# Patient Record
Sex: FEMALE | Race: WHITE
[De-identification: names, ages, dates, MRNs, and addresses within clinical notes are randomized per-mention and may not be internally consistent; named-entity substitution may affect disease eponyms.]

---

## 2019-11-28 ENCOUNTER — HOSPITAL ENCOUNTER (INPATIENT)
Dept: HOSPITAL 97 - ER | Age: 69
LOS: 9 days | Discharge: NURSING, REHAB OR LONG-TERM CARE | DRG: 871 | End: 2019-12-07
Attending: INTERNAL MEDICINE | Admitting: INTERNAL MEDICINE
Payer: COMMERCIAL

## 2019-11-28 VITALS — BODY MASS INDEX: 32.1 KG/M2

## 2019-11-28 DIAGNOSIS — E87.6: ICD-10-CM

## 2019-11-28 DIAGNOSIS — B96.20: ICD-10-CM

## 2019-11-28 DIAGNOSIS — E83.39: ICD-10-CM

## 2019-11-28 DIAGNOSIS — R79.89: ICD-10-CM

## 2019-11-28 DIAGNOSIS — N30.00: ICD-10-CM

## 2019-11-28 DIAGNOSIS — M62.82: ICD-10-CM

## 2019-11-28 DIAGNOSIS — A41.9: Primary | ICD-10-CM

## 2019-11-28 DIAGNOSIS — Z86.73: ICD-10-CM

## 2019-11-28 DIAGNOSIS — I50.32: ICD-10-CM

## 2019-11-28 DIAGNOSIS — G93.41: ICD-10-CM

## 2019-11-28 DIAGNOSIS — Z88.2: ICD-10-CM

## 2019-11-28 DIAGNOSIS — E83.42: ICD-10-CM

## 2019-11-28 DIAGNOSIS — N17.9: ICD-10-CM

## 2019-11-28 DIAGNOSIS — I11.0: ICD-10-CM

## 2019-11-28 PROCEDURE — 70450 CT HEAD/BRAIN W/O DYE: CPT

## 2019-11-28 PROCEDURE — 96365 THER/PROPH/DIAG IV INF INIT: CPT

## 2019-11-28 PROCEDURE — 84484 ASSAY OF TROPONIN QUANT: CPT

## 2019-11-28 PROCEDURE — 94760 N-INVAS EAR/PLS OXIMETRY 1: CPT

## 2019-11-28 PROCEDURE — 85025 COMPLETE CBC W/AUTO DIFF WBC: CPT

## 2019-11-28 PROCEDURE — 82570 ASSAY OF URINE CREATININE: CPT

## 2019-11-28 PROCEDURE — 87186 SC STD MICRODIL/AGAR DIL: CPT

## 2019-11-28 PROCEDURE — 83605 ASSAY OF LACTIC ACID: CPT

## 2019-11-28 PROCEDURE — 82553 CREATINE MB FRACTION: CPT

## 2019-11-28 PROCEDURE — 71045 X-RAY EXAM CHEST 1 VIEW: CPT

## 2019-11-28 PROCEDURE — 83880 ASSAY OF NATRIURETIC PEPTIDE: CPT

## 2019-11-28 PROCEDURE — 93005 ELECTROCARDIOGRAM TRACING: CPT

## 2019-11-28 PROCEDURE — 87040 BLOOD CULTURE FOR BACTERIA: CPT

## 2019-11-28 PROCEDURE — 97161 PT EVAL LOW COMPLEX 20 MIN: CPT

## 2019-11-28 PROCEDURE — 80048 BASIC METABOLIC PNL TOTAL CA: CPT

## 2019-11-28 PROCEDURE — 87088 URINE BACTERIA CULTURE: CPT

## 2019-11-28 PROCEDURE — 86317 IMMUNOASSAY INFECTIOUS AGENT: CPT

## 2019-11-28 PROCEDURE — 84156 ASSAY OF PROTEIN URINE: CPT

## 2019-11-28 PROCEDURE — 97110 THERAPEUTIC EXERCISES: CPT

## 2019-11-28 PROCEDURE — 86225 DNA ANTIBODY NATIVE: CPT

## 2019-11-28 PROCEDURE — 82652 VIT D 1 25-DIHYDROXY: CPT

## 2019-11-28 PROCEDURE — 76770 US EXAM ABDO BACK WALL COMP: CPT

## 2019-11-28 PROCEDURE — 80202 ASSAY OF VANCOMYCIN: CPT

## 2019-11-28 PROCEDURE — 87086 URINE CULTURE/COLONY COUNT: CPT

## 2019-11-28 PROCEDURE — 87340 HEPATITIS B SURFACE AG IA: CPT

## 2019-11-28 PROCEDURE — 86706 HEP B SURFACE ANTIBODY: CPT

## 2019-11-28 PROCEDURE — 84132 ASSAY OF SERUM POTASSIUM: CPT

## 2019-11-28 PROCEDURE — 86021 WBC ANTIBODY IDENTIFICATION: CPT

## 2019-11-28 PROCEDURE — 97112 NEUROMUSCULAR REEDUCATION: CPT

## 2019-11-28 PROCEDURE — 86235 NUCLEAR ANTIGEN ANTIBODY: CPT

## 2019-11-28 PROCEDURE — 97530 THERAPEUTIC ACTIVITIES: CPT

## 2019-11-28 PROCEDURE — 83735 ASSAY OF MAGNESIUM: CPT

## 2019-11-28 PROCEDURE — 86430 RHEUMATOID FACTOR TEST QUAL: CPT

## 2019-11-28 PROCEDURE — 36415 COLL VENOUS BLD VENIPUNCTURE: CPT

## 2019-11-28 PROCEDURE — 81003 URINALYSIS AUTO W/O SCOPE: CPT

## 2019-11-28 PROCEDURE — 83520 IMMUNOASSAY QUANT NOS NONAB: CPT

## 2019-11-28 PROCEDURE — 87077 CULTURE AEROBIC IDENTIFY: CPT

## 2019-11-28 PROCEDURE — 88108 CYTOPATH CONCENTRATE TECH: CPT

## 2019-11-28 PROCEDURE — 82550 ASSAY OF CK (CPK): CPT

## 2019-11-28 PROCEDURE — 87522 HEPATITIS C REVRS TRNSCRPJ: CPT

## 2019-11-28 PROCEDURE — 86160 COMPLEMENT ANTIGEN: CPT

## 2019-11-28 PROCEDURE — 84165 PROTEIN E-PHORESIS SERUM: CPT

## 2019-11-28 PROCEDURE — 93306 TTE W/DOPPLER COMPLETE: CPT

## 2019-11-28 PROCEDURE — 81015 MICROSCOPIC EXAM OF URINE: CPT

## 2019-11-28 PROCEDURE — 86038 ANTINUCLEAR ANTIBODIES: CPT

## 2019-11-28 PROCEDURE — 80069 RENAL FUNCTION PANEL: CPT

## 2019-11-28 PROCEDURE — 96366 THER/PROPH/DIAG IV INF ADDON: CPT

## 2019-11-28 PROCEDURE — 99285 EMERGENCY DEPT VISIT HI MDM: CPT

## 2019-11-28 PROCEDURE — 97116 GAIT TRAINING THERAPY: CPT

## 2019-11-28 PROCEDURE — 80076 HEPATIC FUNCTION PANEL: CPT

## 2019-11-28 PROCEDURE — 85610 PROTHROMBIN TIME: CPT

## 2019-11-28 PROCEDURE — 84443 ASSAY THYROID STIM HORMONE: CPT

## 2019-11-28 PROCEDURE — 86704 HEP B CORE ANTIBODY TOTAL: CPT

## 2019-11-28 PROCEDURE — 80053 COMPREHEN METABOLIC PANEL: CPT

## 2019-11-28 NOTE — XMS REPORT
[facility]

 Created on:November 3, 2019



Patient:Meghan Givens

Sex:Female

:1950

External Reference #:[id number]





Demographics







 Address  [address] [address]

 

 Phone  [phone]

 

 Preferred Language  en

 

 Marital Status  Unknown

 

 [employer] Affiliation  Unknown

 

 Race  White

 

 Ethnic Group  Unknown









Author







 Organization  [employer]









Care Team Providers







 Name  Role  Phone

 

 Bailey, Na  Provider Role  Unavailable









Allergies

No Known Allergies



Problems







 Problem Type  Condition  Code  Onset Dates  Condition Status

 

 Problem  Osteoporosis  M81.0    Active

 

 Problem  Depression with anxiety  F41.8    Active

 

 Problem  Benign essential HTN  I10    Active

 

 Problem  Memory disturbance  R41.3    Active

 

 Problem  Senile cataract, unspecified  H25.9    Active



   age-related cataract type,      



   unspecified laterality      

 

 Problem  GERD with esophagitis  K21.0    Active

 

 Problem  Obese  E66.9    Active

 

 Problem  Elevated blood pressure reading with  I10    Active



   diagnosis of hypertension      

 

 Problem  Body mass index (BMI) of 40.1 to  Z68.41    Active



   44.9 in adult      

 

 Problem  Hypokalemia  E87.6    Active

 

 Problem  Seasonal allergies  J30.2    Active

 

 Problem  Yeast infection of the skin  B37.2    Active

 

 Problem  Osteoporosis without current  M81.0    Active



   pathological fracture, unspecified      



   osteoporosis type      

 

 Problem  CKD (chronic kidney disease) stage  N18.3    Active



   3, GFR 30-59 ml/min      







Medications

No Known Medications



Results

No Known Results



Summary Purpose

[employer] Submission

## 2019-11-28 NOTE — XMS REPORT
[facility]

 Created on:2019



Patient:Meghan Givens

Sex:Female

:1950

External Reference #:[id number]





Demographics







 Address  [address] [place] [address]

 

 Phone  [phone]

 

 Preferred Language  en

 

 Marital Status  Unknown

 

 [employer] Affiliation  Unknown

 

 Race  White

 

 Ethnic Group  Unknown









Author







 Organization  [employer]









Care Team Providers







 Name  Role  Phone

 

 Bailey, Na  Provider Role  Unavailable









Allergies, Adverse Reactions, Alerts







 Substance  Reaction  Event Type

 

 Bactrim DS  Info Not Available  Drug Allergy







Problems







 Problem Type  Condition  Code  Onset Dates  Condition Status

 

 Assessment  Influenza vaccination declined  Z28.21    Active

 

 Assessment  Vitamin D deficiency  E55.9    Active

 

 Assessment  Colon cancer screening declined  Z53.20    Active

 

 Assessment  Screening for osteoporosis  Z13.820    Active

 

 Assessment  Screening for breast cancer  Z12.39    Active

 

 Assessment  Body mass index (BMI) of 40.1 to  Z68.41    Active



   44.9 in adult      

 

 Problem  Depression with anxiety  F41.8    Active

 

 Assessment  Osteoporosis without current  M81.0    Active



   pathological fracture, unspecified      



   osteoporosis type      

 

 Problem  Benign essential HTN  I10    Active

 

 Assessment  CKD (chronic kidney disease) stage  N18.3    Active



   3, GFR 30-59 ml/min      

 

 Problem  Yeast infection of the skin  B37.2    Active

 

 Problem  CKD (chronic kidney disease) stage  N18.3    Active



   3, GFR 30-59 ml/min      

 

 Problem  Seasonal allergies  J30.2    Active

 

 Problem  Screening for breast cancer  Z12.39    Active

 

 Problem  Bilateral lower extremity edema  R60.0    Active

 

 Assessment  Hypokalemia  E87.6    Active

 

 Assessment  Bilateral lower extremity edema  R60.0    Active

 

 Problem  Vitamin D deficiency  E55.9    Active

 

 Assessment  GERD with esophagitis  K21.0    Active

 

 Problem  Body mass index (BMI) of 40.1 to  Z68.41    Active



   44.9 in adult      

 

 Problem  Osteoporosis without current  M81.0    Active



   pathological fracture, unspecified      



   osteoporosis type      

 

 Problem  Elevated blood pressure reading  I10    Active



   with diagnosis of hypertension      

 

 Problem  Hypokalemia  E87.6    Active

 

 Problem  Memory disturbance  R41.3    Active

 

 Assessment  Elevated blood pressure reading  I10    Active



   with diagnosis of hypertension      

 

 Assessment  Encounter for general adult medical  Z00.01    Active



   examination with abnormal findings      

 

 Problem  Osteoporosis  M81.0    Active

 

 Problem  Obese  E66.9    Active

 

 Problem  Senile cataract, unspecified  H25.9    Active



   age-related cataract type,      



   unspecified laterality      

 

 Problem  GERD with esophagitis  K21.0    Active







Medications







 Medication  Code  Code  Instructions  Start  End  Status  Dosage



   System      Date  Date    

 

 Alendronate  NDC  65689224055  70 MG Orally    March  Active  TAKE ONE



 Sodium          28,    WEEKLY



               

 

 Cholecalciferol  NDC  0  5000 UNIT      Active  1 capsule



       Orally Once a        



       day        

 

 Losartan  NDC  01248399178  50 MG Orally      Active  1 tablet



 Potassium      Once a day        

 

 Nystatin  NDC  40153130805  965216 UNIT/ML      Active  1 application



       Mouth/Throat        to affected



       Four times a        area



       day        

 

 Spironolactone  NDC  49753328096  50 MG Orally      Active  1 tablet with



       Once a day        food

 

 Metoprolol  NDC  86498263679  50 MG Orally      Active  1 tablet with



 Tartrate      Twice a day        food

 

 Triamcinolone  NDC  47005127769  0.1 %      Active  1 application



 Acetonide      Externally        to affected



       Twice a day        area

 

 Protonix  NDC  53960399893  40 MG Orally      Active  1 tablet



       Once a day        

 

 Potassium  NDC  07004441584  20 MEQ Orally    May 17,  Active  2 packets



 Chloride      Once a day        with food

 

 Amlodipine  NDC  69522258867  5 MG Orally      Active  1 tablet



 Besylate      Once a day        

 

 Donepezil HCl  NDC  24777671075  10 MG Orally      Active  1 tablet at



       Once a day        bedtime

 

 Alendronate  NDC  65749485557  70 MG Orally    May 17,  Active  1 tablet



 Sodium      once a week        

 

 Ketoconazole  NDC  49853440138  2 % Externally      Active  1 application



       twice a day        to affected



               area







Results

No Known Results



Summary Purpose

[employer] Submission

## 2019-11-29 LAB
ALBUMIN SERPL BCP-MCNC: 3.2 G/DL (ref 3.4–5)
ALBUMIN SERPL BCP-MCNC: 3.5 G/DL (ref 3.4–5)
ALP SERPL-CCNC: 81 U/L (ref 45–117)
ALP SERPL-CCNC: 87 U/L (ref 45–117)
ALT SERPL W P-5'-P-CCNC: 36 U/L (ref 12–78)
ALT SERPL W P-5'-P-CCNC: 39 U/L (ref 12–78)
AST SERPL W P-5'-P-CCNC: 101 U/L (ref 15–37)
AST SERPL W P-5'-P-CCNC: 103 U/L (ref 15–37)
BUN BLD-MCNC: 11 MG/DL (ref 7–18)
BUN BLD-MCNC: 11 MG/DL (ref 7–18)
CKMB CREATINE KINASE MB: 16.1 NG/ML (ref 0.3–3.6)
GLUCOSE SERPLBLD-MCNC: 121 MG/DL (ref 74–106)
GLUCOSE SERPLBLD-MCNC: 131 MG/DL (ref 74–106)
HCT VFR BLD CALC: 41.1 % (ref 36–45)
HCT VFR BLD CALC: 45.8 % (ref 36–45)
INR BLD: 1.37
LYMPHOCYTES # SPEC AUTO: 1 K/UL (ref 0.7–4.9)
LYMPHOCYTES # SPEC AUTO: 1 K/UL (ref 0.7–4.9)
MAGNESIUM SERPL-MCNC: 1.8 MG/DL (ref 1.8–2.4)
MORPHOLOGY BLD-IMP: (no result)
PMV BLD: 8.5 FL (ref 7.6–11.3)
PMV BLD: 8.9 FL (ref 7.6–11.3)
POTASSIUM SERPL-SCNC: 2.4 MMOL/L (ref 3.5–5.1)
POTASSIUM SERPL-SCNC: 2.7 MMOL/L (ref 3.5–5.1)
RBC # BLD: 4.38 M/UL (ref 3.86–4.86)
RBC # BLD: 4.83 M/UL (ref 3.86–4.86)
TROPONIN (EMERG DEPT USE ONLY): 0.1 NG/ML (ref 0–0.04)
TROPONIN I: 0.15 NG/ML (ref 0–0.04)
UA COMPLETE W REFLEX CULTURE PNL UR: (no result)

## 2019-11-29 RX ADMIN — LEVOFLOXACIN SCH MLS: 5 INJECTION, SOLUTION INTRAVENOUS at 05:55

## 2019-11-29 RX ADMIN — PANTOPRAZOLE SODIUM SCH MG: 40 TABLET, DELAYED RELEASE ORAL at 05:58

## 2019-11-29 RX ADMIN — HEPARIN SODIUM SCH UNIT: 5000 INJECTION, SOLUTION INTRAVENOUS; SUBCUTANEOUS at 10:14

## 2019-11-29 RX ADMIN — DEXTROSE AND SODIUM CHLORIDE SCH MLS: 5; .9 INJECTION, SOLUTION INTRAVENOUS at 10:43

## 2019-11-29 RX ADMIN — Medication SCH MG: at 10:16

## 2019-11-29 RX ADMIN — Medication SCH ML: at 20:05

## 2019-11-29 RX ADMIN — DEXTROSE AND SODIUM CHLORIDE SCH: 5; .9 INJECTION, SOLUTION INTRAVENOUS at 04:00

## 2019-11-29 RX ADMIN — METOPROLOL TARTRATE SCH MG: 25 TABLET ORAL at 05:58

## 2019-11-29 RX ADMIN — PIPERACILLIN SODIUM AND TAZOBACTAM SODIUM SCH MLS: 3; .375 INJECTION, POWDER, LYOPHILIZED, FOR SOLUTION INTRAVENOUS at 10:43

## 2019-11-29 RX ADMIN — Medication SCH ML: at 10:44

## 2019-11-29 RX ADMIN — POTASSIUM CHLORIDE AND SODIUM CHLORIDE SCH MLS: 900; 300 INJECTION, SOLUTION INTRAVENOUS at 15:06

## 2019-11-29 RX ADMIN — METOPROLOL TARTRATE SCH MG: 25 TABLET ORAL at 17:21

## 2019-11-29 RX ADMIN — HEPARIN SODIUM SCH UNIT: 5000 INJECTION, SOLUTION INTRAVENOUS; SUBCUTANEOUS at 17:21

## 2019-11-29 RX ADMIN — PIPERACILLIN SODIUM AND TAZOBACTAM SODIUM SCH MLS: 3; .375 INJECTION, POWDER, LYOPHILIZED, FOR SOLUTION INTRAVENOUS at 20:05

## 2019-11-29 NOTE — EKG
Test Date:    2019-11-29               Test Time:    00:23:38

Technician:   RR                                     

                                                     

MEASUREMENT RESULTS:                                       

Intervals:                                           

Rate:         101                                    

FL:           208                                    

QRSD:         96                                     

QT:           376                                    

QTc:          487                                    

Axis:                                                

P:            47                                     

FL:           208                                    

QRS:          -34                                    

T:            0                                      

                                                     

INTERPRETIVE STATEMENTS:                                       

                                                     

Sinus tachycardia with premature atrial complexes

Left axis deviation

non specific ST and T abnormality

Abnormal ECG

Compared to ECG 01/23/2017 16:51:10

Atrial premature complex(es) now present

Sinus rhythm no longer present

Ventricular premature complex(es) no longer present



Electronically Signed On 11-29-19 09:39:12 CST by Sergey Carrillo

## 2019-11-29 NOTE — RAD REPORT
EXAM DESCRIPTION:  CT - Head Brain Wo Cont - 11/29/2019 2:30 am

 

CLINICAL HISTORY:  Fall, confusion. Prior MCA aneurysm.

 

COMPARISON:  4/17/2018

 

TECHNIQUE:  Axial 5 mm unenhanced CT imaging of the brain. Reformatted coronal and sagittal images ob
tained.

This examination was performed according to our departmental dose optimization program, which include
s automated exposure control, adjustment of the mA and/or kV according to patient size and/or use of 
iterative reconstruction technique.

 

FINDINGS:  Right occipital intraventricular shunt catheter terminates within the medial aspect of the
 frontal horn of the right lateral ventricle. Position is stable. Mild prominence of the ventricles d
ue to cortical atrophy. The ventricle size and contour stable since 2018. There is left lateral front
al and temporal encephalomalacia due to prior MCA infarct and prior surgery. Metallic densities along
 the left MCA territory from prior surgery. Overlying lateral left temporal craniotomy also noted.

There is significant decreased white matter attenuation secondary to chronic microvascular ischemic c
hange. There is no mass or midline shift. There is no intracranial acute bleed. The cerebellum and ve
rmis appear normal. Fourth ventricle is midline.

Intraorbital contents appear normal. Clear paranasal sinuses and mastoid air cells as visualized. Sku
ll base is intact. Mild right parietal scalp soft tissue swelling. No foreign body or subcutaneous em
physema.

 

IMPRESSION:  1. Right parietal scalp soft tissue edema. No intracranial bleed or skull fracture.

2. Stable moderate to marked senescent brain changes. Stable postoperative left temporal changes as d
etailed above.

 

Electronically signed by:   La Black DO   11/29/2019 2:21 AM CST Workstation: [id number]

 

 

Due to temporary technical issues with the PACS/Fluency reporting system, reports are being signed by
 the in house radiologist as a courtesy to ensure prompt reporting. The interpreting radiologist is f
ully responsible for the content of the report

## 2019-11-29 NOTE — EDPHYS
Physician Documentation                                                                           

 [facility]                                                                 

Name: Meghan Givens                                                                               

Age: 69 yrs                                                                                       

Sex: Female                                                                                       

: 1950                                                                                   

MRN: [id number]                                                                                   

Arrival Date: 2019                                                                          

Time: 23:48                                                                                       

Account#: [id number]                                                                            

Bed 4                                                                                             

Private MD:                                                                                       

ED Physician Damaso Cooper                                                                             

HPI:                                                                                              

                                                                                             

00:16 This 69 yrs old  Female presents to ER via EMS with complaints of Confusion.   pkl 

00:16 Patient apparently fell earlier today at about 1230. Was found on the floor. has        pkl 

      history of UTI and altered mental status.                                                   

                                                                                                  

Historical:                                                                                       

- Allergies:                                                                                      

00:00 Codeine;                                                                                jd3 

00:00 Sulfa (Sulfonamide Antibiotics);                                                        jd3 

- Home Meds:                                                                                      

00:14 Unable to obtain [Active];                                                              jd3 

- PMHx:                                                                                           

00:00 brain aneurysm; embolism uknown; Hypertension; UTI;                                     jd3 

00:14 High Cholesterol;                                                                       jd3 

- PSHx:                                                                                           

00:00 brain surgery; shunt placement; Tonsillectomy; vena cava filter; abd sx;                jd3 

                                                                                                  

- Immunization history:: Adult Immunizations up to date.                                          

- Social history:: Smoking status: Patient/guardian denies using tobacco.                         

- Ebola Screening: : Patient negative for fever greater than or equal to 101.5 degrees            

  Fahrenheit, and additional compatible Ebola Virus Disease symptoms.                             

                                                                                                  

                                                                                                  

ROS:                                                                                              

00:20 Eyes: Negative for injury, pain, redness, and discharge, ENT: Negative for injury,      pkl 

      pain, and discharge, Neck: Negative for injury, pain, and swelling, Cardiovascular:         

      Negative for chest pain, palpitations, and edema, Respiratory: Negative for shortness       

      of breath, cough, wheezing, and pleuritic chest pain, Abdomen/GI: Negative for              

      abdominal pain, nausea, vomiting, diarrhea, and constipation, Back: Negative for injury     

      and pain.                                                                                   

00:20 : Negative for urinary symptoms.                                                          

00:20 MS/extremity: Negative for injury or acute deformity.                                       

00:20 Skin: Negative for rash.                                                                    

00:20 Neuro: Positive for confusion.                                                              

                                                                                                  

Exam:                                                                                             

00:20 Head/Face:  Normocephalic, atraumatic. Eyes:  Pupils equal round and reactive to light, pkl 

      extra-ocular motions intact.  Lids and lashes normal.  Conjunctiva and sclera are           

      non-icteric and not injected.  Cornea within normal limits.  Periorbital areas with no      

      swelling, redness, or edema. ENT:  Nares patent. No nasal discharge, no septal              

      abnormalities noted.  Tympanic membranes are normal and external auditory canals are        

      clear.  Oropharynx with no redness, swelling, or masses, exudates, or evidence of           

      obstruction, uvula midline.  Mucous membranes moist. Neck:  Trachea midline, no             

      thyromegaly or masses palpated, and no cervical lymphadenopathy.  Supple, full range of     

      motion without nuchal rigidity, or vertebral point tenderness.  No Meningismus.             

      Chest/axilla:  Normal chest wall appearance and motion.  Nontender with no deformity.       

      No lesions are appreciated. Cardiovascular:  Regular rate and rhythm with a normal S1       

      and S2.  No gallops, murmurs, or rubs.  Normal PMI, no JVD.  No pulse deficits.             

      Respiratory:  Lungs have equal breath sounds bilaterally, clear to auscultation and         

      percussion.  No rales, rhonchi or wheezes noted.  No increased work of breathing, no        

      retractions or nasal flaring. Abdomen/GI:  Soft, non-tender, with normal bowel sounds.      

      No distension or tympany.  No guarding or rebound.  No evidence of tenderness               

      throughout. Back:  No spinal tenderness.  No costovertebral tenderness.  Full range of      

      motion. Skin:  Warm, dry with normal turgor.  Normal color with no rashes, no lesions,      

      and no evidence of cellulitis. MS/ Extremity:  Pulses equal, no cyanosis.                   

      Neurovascular intact.  Full, normal range of motion.                                        

00:20 Neuro: Orientation: appropriate for stated age, Mentation: is normal, Cranial nerves:       

      grossly normal, Motor: moves all fours.                                                     

                                                                                                  

Vital Signs:                                                                                      

00:00  / 119; Pulse 100; Resp 19 S; Temp 98.3(O); Pulse Ox 95% on R/A; Weight 85.28 kg  jd3 

      (R); Height 5 ft. 4 in. (162.56 cm) (R); Pain 0/10;                                         

00:31  / 84; Pulse 97; Resp 20 S; Pulse Ox 93% on R/A; Pain 0/10;                       jd3 

02:08  / 94; Pulse 86; Resp 20 S; Pulse Ox 95% on R/A;                                  jd3 

03:16  / 100; Pulse 88; Resp 19 S; Pulse Ox 95% on 2 lpm NC;                            jd3 

04:14  / 77; Pulse 94; Resp 15 S; Pulse Ox 94% on 2 lpm NC; Pain 0/10;                  jd3 

00:00 Body Mass Index 32.27 (85.28 kg, 162.56 cm)                                             jd3 

                                                                                                  

MDM:                                                                                              

                                                                                             

23:49 Patient medically screened.                                                             pkl 

                                                                                             

02:26 Data reviewed: vital signs, nurses notes, lab test result(s), EKG, radiologic studies,  pkl 

      CT scan, plain films. ED course: Talked to Dr. Lerma. To admit.                          

                                                                                                  

                                                                                             

23:58 Order name: Urine Dipstick--Ancillary (enter results)                                   oe  

                                                                                             

00:04 Order name: Urine Microscopic Only                                                      jd3 

                                                                                             

00:04 Order name: Urine Culture                                                               jd3 

                                                                                             

00:15 Order name: Basic Metabolic Panel                                                       pkl 

                                                                                             

00:15 Order name: CBC with Diff                                                               pkl 

                                                                                             

00:15 Order name: LFT's                                                                       pkl 

                                                                                             

00:15 Order name: Magnesium                                                                   pkl 

                                                                                             

00:15 Order name: NT PRO-BNP                                                                  pkl 

                                                                                             

00:15 Order name: PT-INR                                                                      pkl 

                                                                                             

00:15 Order name: Troponin (emerg Dept Use Only)                                              pkl 

                                                                                             

00:20 Order name: Lactate                                                                     pkl 

                                                                                             

00:33 Order name: Urine Dipstick-Ancillary; Complete Time: 00:43                              EDMS

                                                                                             

00:39 Order name: Urine Microscopic Only; Complete Time: 00:43                                EDMS

                                                                                             

02:09 Order name: Protime (+INR); Complete Time: 02:11                                        EDMS

                                                                                             

00:15 Order name: XRAY Chest (1 view)                                                         pkl 

                                                                                             

00:15 Order name: CT Head Brain wo Cont                                                       pkl 

                                                                                             

02:15 Order name: Basic Metabolic Panel; Complete Time: 02:17                                 EDMS

                                                                                             

02:15 Order name: Liver (Hepatic) Function; Complete Time: 02:17                              EDMS

                                                                                             

02:15 Order name: Troponin (Emerg Dept Use Only); Complete Time: 02:17                        EDMS

                                                                                             

02:15 Order name: NT PRO-BNP; Complete Time: 02:17                                            EDMS

                                                                                             

02:15 Order name: Magnesium; Complete Time: 02:17                                             EDMS

                                                                                             

02:15 Order name: Lactate; Complete Time: 02:17                                               EDMS

                                                                                             

02:17 Order name: CBC with Automated Diff; Complete Time: 02:23                               EDMS

                                                                                             

02:17 Order name: Manual Differential; Complete Time: 02:23                                   EDMS

                                                                                             

04:41 Order name: CBC with Automated Diff; Complete Time: 06:03                               EDMS

                                                                                             

04:58 Order name: Comprehensive Metabolic Panel; Complete Time: 06:03                         EDMS

                                                                                             

05:00 Order name: Troponin I                                                                  [facility]

                                                                                             

05:15 Order name: Lactate Sepsis 2 HR Follow-up; Complete Time: 06:03                         EDMS

                                                                                             

00:15 Order name: EKG; Complete Time: 00:16                                                   pkl 

                                                                                             

00:15 Order name: Cardiac monitoring; Complete Time: 00:25                                    pkl 

                                                                                             

00:15 Order name: EKG - Nurse/Tech; Complete Time: 00:25                                      pkl 

                                                                                             

00:15 Order name: IV Saline Lock; Complete Time: 00:21                                        pkl 

                                                                                             

00:15 Order name: Labs collected and sent; Complete Time: 00:25                               pkl 

                                                                                             

00:15 Order name: O2 Per Protocol; Complete Time: 00:19                                       pkl 

                                                                                             

00:15 Order name: O2 Sat Monitoring; Complete Time: 00:19                                     pkl 

                                                                                                  

Administered Medications:                                                                         

02:34 Drug: NS 0.9% 1000 ml Route: IV; Rate: 125 ml/hr; Site: left forearm;                   rr5 

05:18 Follow up: Response: No adverse reaction; IV Status: Infusion continued upon admission  jd3 

02:35 Drug: Potassium Chloride 20 mEq Route: IV; Rate: per protocol; Site: left forearm;      rr5 

05:18 Follow up: Response: No adverse reaction; IV Status: Infusion continued upon admission  jd3 

                                                                                                  

                                                                                                  

Disposition:                                                                                      

19 02:29 Hospitalization ordered by Seven Lerma for Inpatient Admission. Preliminary    

  diagnosis is Confusion. Urosepsis. Hypokalemia.                                                 

- Bed requested for Intensive Care Unit.                                                          

- Status is Inpatient Admission.                                                              bb  

- Condition is Stable.                                                                            

- Problem is new.                                                                                 

- Symptoms are unchanged.                                                                         

UTI on Admission? Yes                                                                             

                                                                                                  

                                                                                                  

                                                                                                  

Signatures:                                                                                       

Dispatcher [facility]                                                 

Patricia Singh RN RN mw Lam, Pin, MD MD pkl Ballard, Brenda, RN RN bb Davies, Jonathon, RN RN   jd3                                                  

Jeferson Matthews RN                      RN   rr5                                                  

                                                                                                  

Corrections: (The following items were deleted from the chart)                                    

00:14 00:00 Home Meds: blood pressure pills unknown [Inactive]; jd3                           jd3 

00:14 00:00 Home Meds: Tylenol [Inactive]; jd3                                                jd3 

00:14 00:00 Home Meds: lisinopril-hydrochlorothiazide 20-12.5 mg Oral tab 2 tab once daily    jd3 

      [Inactive]; jd3                                                                             

02:46 02:29 Hospitalization Ordered by Seven Lerma MD for Inpatient Admission. Preliminary pkl 

      diagnosis is Confusion. Urosepsis. Hypokalemia. Bed requested for Telemetry/MedSurg         

      (Inpatient). Status is Inpatient Admission. Condition is Stable. Problem is new.            

      Symptoms are unchanged. UTI on Admission? Yes. pkl                                          

03:42 02:46 2019 02:29 Hospitalization Ordered by Seven Lerma MD for Inpatient       mw  

      Admission. Preliminary diagnosis is Confusion. Urosepsis. Hypokalemia. Bed requested        

      for Intensive Care Unit. Status is Inpatient Admission. Condition is Stable. Problem is     

      new. Symptoms are unchanged. UTI on Admission? Yes. pkl                                     

05:18 03:42 2019 02:29 Hospitalization Ordered by Seven Lerma MD for Inpatient       bb  

      Admission. Preliminary diagnosis is Confusion. Urosepsis. Hypokalemia. Bed requested        

      for Intensive Care Unit. Status is Inpatient Admission. Condition is Stable. Problem is     

      new. Symptoms are unchanged. UTI on Admission? Yes. mw                                      

                                                                                                  

**************************************************************************************************

## 2019-11-29 NOTE — RAD REPORT
EXAM DESCRIPTION:  Jhonathan Single View11/29/2019 12:54 am

 

CLINICAL HISTORY:  Chest pain

 

COMPARISON:  2017

 

FINDINGS:   The lungs appear clear of acute infiltrate. The heart is mildly enlarged

 

IMPRESSION:   No acute abnormalities displayed

## 2019-11-29 NOTE — CON
Date of Consultation:  11/29/2019



Reason For Consultation:  Hypokalemia, hypomagnesemia, rhabdomyolysis.



History Of Present Illness:  This is a pleasant 69-year-old female with significant past medical hist
ory of hypertension, CVA, status post brain aneurysm clip, complicated with subarachnoid bleed, DVT s
tatus post IVC filter, apparently patient found on the floor in the assisted living for almost 10-12 
hours after fall.  Upon arrival to the hospital, found to have UTI and rhabdomyolysis.  For that reas
on, we have been consulted.  Patient was started on aggressive hydration.  Patient is slightly confus
ed.  Patient denied any fever, any chills.



Past Medical History:  Includes:

1.Hypertension.

2.CVA.

3.Brain aneurysm, status post clips.

4.Subarachnoid hemorrhage.

5.DVT status post IVC filter.



Past Surgical History:  Includes:

1.Brain aneurysm clip.  

2.IVC filter.

3.Tonsillectomy.



Family History:  Positive for hypertension.



Allergies:  TO CODEINE AND SULFA.



Nursing Home Medications:  Include:

1.Levaquin.

2.Thiamine.

3.Pantoprazole.

4.Vitamin with calcium.



Current Medications In The Hospital:  Include:

1.Tylenol.

2.Albuterol.

3.Levaquin.

4.Melatonin.

5.Metoprolol 25 b.i.d.

6.Pantoprazole.

7.Zosyn.

8.IV fluid.



Review of Systems:

General:  Patient is a little bit confused.  

Head and Neck:  No red eye.  Has headache. 

GI:  Has nausea.  No vomiting. 

:  No polyuria, no dysuria, no hematuria. 

Gyn:  No vaginal discharge. 

Respiratory:  Has shortness of breath. 

Cardiovascular:  No chest pain.  Has leg swelling. 

Endocrine:  No polydipsia. 

Skin:  No rash. 

Neuro:  Has fall.  

Musculoskeletal:  Has muscle cramp. 

Endocrine:  No polydipsia.



Physical Examination:

Vital Signs:  When I saw the patient, blood pressure 118/67, pulse of 89.  Patient had good urine out
put. 

Chest:  Faint crackles bilateral on the base. 

Heart:  S1, S2.  Systolic murmur. 

Abdomen:  Soft, nontender. 

Extremities:  Trace edema. 

Neuro:  Alert, still confused.  No focal.



Laboratory Data:  WBC 21.4, H and H 14.6/41.1, platelets 267.  Baseline H and H are 13/38.1.  Sodium 
145, potassium 2.7, bicarb 25, BUN 11, creatinine 0.9, calcium 8.4.  Lactic acid 2.6.  CK 4198.  [place] 3.2, corrected calcium is 9.  Vitamin D level before is 11.  BNP 3700. 



Chest x-ray showing no infiltration.  UA showing a specific gravity of 1.015, pH of 6.5, rbc of 20, w
bc more than 50, +2 protein.



Assessment And Plan:  

1.Rhabdomyolysis secondary to fall.  I am going to continue the patient on aggressive hydration.  I 
am going to increase intravenous fluid to 125 per hour and we will add potassium to it.

2.We will monitor her kidney function and her urine pH, currently above 6.5, which is acceptable.  

3.Hypokalemia, hypomagnesemia.  We will supplement.  I am going to advise to have at least 120 mEq o
f potassium today.  We will add 40 to her fluid and we will follow up.  

4.Fall.  We will follow up with the Primary.

5.Urinary tract infection.  Continue current antibiotic.  We will follow up culture.  

Thank you, Dr. Sanchez, for allowing us to participate in the care of your patient.





KATE

DD:  11/29/2019 12:57:15Voice ID:  [id number]

DT:  11/29/2019 16:52:15Report ID:  [id number]

## 2019-11-29 NOTE — ER
Nurse's Notes                                                                                     

 [facility]                                                                 

Name: Meghan Givens                                                                               

Age: 69 yrs                                                                                       

Sex: Female                                                                                       

: 1950                                                                                   

MRN: [id number]                                                                                   

Arrival Date: 2019                                                                          

Time: 23:48                                                                                       

Account#: [id number]                                                                            

Bed 4                                                                                             

Private MD:                                                                                       

Diagnosis: Confusion. Urosepsis. Hypokalemia                                                      

                                                                                                  

Presentation:                                                                                     

                                                                                             

23:53 Presenting complaint: EMS states: "it was reported to use that the pt had fallen today  jd3 

      and has been on the ground since roughly 1230. family said they talked to her at 0900       

      this morning and she was fine. family reported to us that she has a history of UTI's        

      that cause AMS. the pt can tell us her name and date of birth, but is disoriented to        

      time, situation. pt denies any pain at this time. the pt smelled heavily of urine on        

      our arrival. family reported to us that she has recently been diagnosed with a UTI.         

      when asking for home medications we were not able to find any in the house and family       

      reported to us that there might be a problem with getting medications filled having         

      problems with the pharmacy and insurance.". Transition of care: patient was not             

      received from another setting of care. Onset of symptoms was 2019. Risk        

      Assessment: Do you want to hurt yourself or someone else? Patient reports no desire to      

      harm self or others. Initial Sepsis Screen: Does the patient meet any 2 criteria?           

      Altered Mental Status. No. Patient's initial sepsis screen is negative. Does the            

      patient have a suspected source of infection? No. Patient's initial sepsis screen is        

      negative. Care prior to arrival: IV initiated. 20 GA, in the right antecubital area.        

23:53 Method Of Arrival: EMS: [facility]                                                jd3 

23:53 Acuity: MEGHANA 2                                                                           jd3 

                                                                                                  

Historical:                                                                                       

- Allergies:                                                                                      

                                                                                             

00:00 Codeine;                                                                                jd3 

00:00 Sulfa (Sulfonamide Antibiotics);                                                        jd3 

- Home Meds:                                                                                      

00:14 Unable to obtain [Active];                                                              jd3 

- PMHx:                                                                                           

00:00 brain aneurysm; embolism uknown; Hypertension; UTI;                                     jd3 

00:14 High Cholesterol;                                                                       jd3 

- PSHx:                                                                                           

00:00 brain surgery; shunt placement; Tonsillectomy; vena cava filter; abd sx;                jd3 

                                                                                                  

- Immunization history:: Adult Immunizations up to date.                                          

- Social history:: Smoking status: Patient/guardian denies using tobacco.                         

- Ebola Screening: : Patient negative for fever greater than or equal to 101.5 degrees            

  Fahrenheit, and additional compatible Ebola Virus Disease symptoms.                             

                                                                                                  

                                                                                                  

Screenin:05 Abuse screen: Denies threats or abuse. Nutritional screening: No deficits noted.        jd3 

      Tuberculosis screening: No symptoms or risk factors identified. Fall Risk Fall in past      

      12 months (25 points). IV access (20 points). Ambulatory Aid- Crutches/Cane/Walker (15      

      pts). Gait- Weak (10 pts.). Mental Status- Overestimates/Forgets Limitations (15 pts.).     

      Total Teran Fall Scale indicates High Risk Score (45 or more points). Fall prevention       

      measures have been instituted. Side Rails Up X 2 Placed Close to Nursing Station            

      Frequent Obs/Assessments Occuring Family Present and informed to notify staff if the        

      need to leave the bedside.                                                                  

                                                                                                  

Assessment:                                                                                       

00:02 General: Appears in no apparent distress. comfortable, Behavior is calm, cooperative,   jd3 

      Smells of urine. Pain: Denies pain. Neuro: Level of Consciousness is awake, alert,          

      obeys commands, confused, Oriented to person, place. Cardiovascular: Denies chest pain,     

      Heart tones S1 S2 present Capillary refill < 3 seconds Patient's skin is warm and dry.      

      Respiratory: Airway is patent Respiratory effort is even, unlabored, Respiratory            

      pattern is regular, symmetrical, Breath sounds are clear bilaterally. Denies cough,         

      shortness of breath. GI: No signs and/or symptoms were reported involving the               

      gastrointestinal system. : Urine is cloudy, Denies burning with urination. EENT: No       

      signs and/or symptoms were reported regarding the EENT system. Derm: Skin is intact,        

      Skin is dry, Skin is normal, Skin temperature is warm Wound noted left elbow Wound is       

      hematoma and small aberration noted to left elbow. Musculoskeletal: Circulation,            

      motion, and sensation intact. Range of motion: intact in all extremities.                   

00:32 Reassessment: Patient appears in no apparent distress at this time. No changes from     jd3 

      previously documented assessment. Patient and/or family updated on plan of care and         

      expected duration. Pain level reassessed. family at bedside.                                

01:00 Reassessment: Patient appears in no apparent distress at this time. No changes from     jd3 

      previously documented assessment. Patient and/or family updated on plan of care and         

      expected duration. Pain level reassessed.                                                   

02:06 Reassessment: Patient appears in no apparent distress at this time. No changes from     jd3 

      previously documented assessment. Patient and/or family updated on plan of care and         

      expected duration. Pain level reassessed. pt A\T\O X 2. disoriented to time and             

      situation. even and unlabored respirations, no distress noted, in bed with family at        

      bedside. awaiting results.                                                                  

02:15 Reassessment: provider notified of critical labs involving lactate at 2.6, potassium at jd3 

      2.4, WBC at 22.3.                                                                           

03:16 Reassessment: Patient appears in no apparent distress at this time. No changes from     jd3 

      previously documented assessment. Patient and/or family updated on plan of care and         

      expected duration. Pain level reassessed. awaiting orders and room assignment.              

04:15 Reassessment: Patient appears in no apparent distress at this time. No changes from     jd3 

      previously documented assessment. Patient and/or family updated on plan of care and         

      expected duration. Pain level reassessed. Patient denies pain at this time.                 

04:25 Reassessment: report given to Jacqui GANDHI nurse for ICU 1.                                 jd3 

                                                                                                  

Vital Signs:                                                                                      

00:00  / 119; Pulse 100; Resp 19 S; Temp 98.3(O); Pulse Ox 95% on R/A; Weight 85.28 kg  jd3 

      (R); Height 5 ft. 4 in. (162.56 cm) (R); Pain 0/10;                                         

00:31  / 84; Pulse 97; Resp 20 S; Pulse Ox 93% on R/A; Pain 0/10;                       jd3 

02:08  / 94; Pulse 86; Resp 20 S; Pulse Ox 95% on R/A;                                  jd3 

03:16  / 100; Pulse 88; Resp 19 S; Pulse Ox 95% on 2 lpm NC;                            jd3 

04:14  / 77; Pulse 94; Resp 15 S; Pulse Ox 94% on 2 lpm NC; Pain 0/10;                  jd3 

00:00 Body Mass Index 32.27 (85.28 kg, 162.56 cm)                                             jd3 

                                                                                                  

ED Course:                                                                                        

                                                                                             

23:48 Patient arrived in ED.                                                                  ds1 

23:49 Damaso Cooper MD is Attending Physician.                                                    pkl 

23:53 Hunter Jameson, RN is Primary Nurse.                                                  jd3 

23:59 Triage completed.                                                                       jd3 

                                                                                             

00:01 Arm band placed on.                                                                     jd3 

00:05 Patient has correct armband on for positive identification. Placed in gown. Bed in low  jd3 

      position. Call light in reach. Side rails up X2. Adult w/ patient.                          

01:17 CT completed. Patient tolerated procedure well. Patient moved to CT via stretcher.        

      Patient moved back from CT.                                                                 

01:45 Inserted saline lock: 20 gauge in left forearm, using aseptic technique. Blood          rr5 

      collected.                                                                                  

02:15 Notified ED physician of a critical lab result(s). lactate of 2.6, potassium of 2.4,    jd3 

      WBC of 22.3.                                                                                

02:28 Seven Lerma MD is Hospitalizing Provider.                                          pkl 

04:15 Patient admitted, IV remains in place.                                                  jd3 

04:15 No provider procedures requiring assistance completed.                                  jd3 

                                                                                                  

Administered Medications:                                                                         

02:34 Drug: NS 0.9% 1000 ml Route: IV; Rate: 125 ml/hr; Site: left forearm;                   rr5 

05:18 Follow up: Response: No adverse reaction; IV Status: Infusion continued upon admission  jd3 

02:35 Drug: Potassium Chloride 20 mEq Route: IV; Rate: per protocol; Site: left forearm;      rr5 

05:18 Follow up: Response: No adverse reaction; IV Status: Infusion continued upon admission  jd3 

                                                                                                  

                                                                                                  

Outcome:                                                                                          

02:29 Decision to Hospitalize by Provider.                                                    pkl 

05:18 Patient left the ED.                                                                    bb  

05:18 Admitted to ICU accompanied by nurse, via stretcher, room 1, on monitor, with chart,    jd3 

      Report called to  Jacqui GANDHI                                                                  

05:18 Condition: stable                                                                           

05:18 Instructed on the need for admit, Demonstrated understanding of instructions.               

                                                                                                  

Signatures:                                                                                       

Damaso Cooper MD MD   pkl                                                  

Keven Morfin                                                                                   

Bella Oakes                                ds1                                                  

Kayla Mi RN RN bb Davies, Jonathon, RN RN   jJeferson Louie, OKSANA                      RN   rr5                                                  

                                                                                                  

Corrections: (The following items were deleted from the chart)                                    

00:06 00:05 Fall Risk Ambulatory Aid- None/Bed Rest/Nurse Assist (0 pts). Gait- Normal/Bed    jd3 

      Rest/Wheelchair (0 pts) Mental Status- Oriented to own ability (0 pts). Total Teran         

      Fall Scale indicates No Risk (0-24 pts). jd3                                                

 00:00 Home Meds: blood pressure pills unknown [Inactive]; jd3                           jd3 

 00:00 Home Meds: Tylenol [Inactive]; jd3                                                jd3 

 00:00 Home Meds: lisinopril-hydrochlorothiazide 20-12.5 mg Oral tab 2 tab once daily    jd3 

      [Inactive]; jd3                                                                             

 00:02 Derm: Skin is intact, Skin is dry, Skin is normal, Skin temperature is warm jd3   jd3 

 00:02 General: Appears in no apparent distress. comfortable, obese, Behavior is calm,   jd3 

      cooperative, Smells of urine. jd3                                                           

 00:02 : Urine is cloudy, Denies burning with urination, jd3                           jd3 

04:17 04:14 Pulse 94bpm; Resp 15bpm; Spontaneous; Pulse Ox 94% 2 lpm Nasal Cannula; Pain      jd3 

      0/10; jd3                                                                                   

                                                                                                  

**************************************************************************************************

## 2019-11-29 NOTE — PN
Date of Progress Note:  11/29/2019



Subjective:  Patient seen and examined.  Chart reviewed and case discussed with RN.  Patient still se
ems to be somewhat lethargic, ill-appearing, and complains of some generalized malaise.



Code Status:  Full.



Medications:  List reviewed.



Physical Examination:

Vital Signs:  Temperature 100, pulse 96, blood pressure 150/93, respirations 16, O2 of 97% on room ai
r. 

General:  Awake, alert, oriented x3, obese female, ill-appearing. 

CV:  S1, S2.  Sinus tachycardia.  Peripheral pulses present. 

Respiratory:  Moving air well bilaterally.  No wheezing or stridor.  No use of accessory muscles. 

Gastrointestinal:  Abdomen is soft, nontender, nondistended.  Positive bowel sounds. 

Extremities:  No clubbing, cyanosis, or edema. 

Neurologic:  Nonfocal.



Laboratory Data:  Sodium 145, potassium 2.7, chloride 110, CO2 of 25, BUN 11, creatinine 0.99, glucos
e 121.  Lactate 2.6.  Repeat lactate is 1.9, calcium 8.4, total bilirubin 1.5, , ALT 36.  CK l
evel is 4198, troponin 0.15.  Albumin 3.2.  Repeat potassium 2.7.  WBC 21.4, H and H 14.6 and 41.1, p
latelets 267, neutrophils 87%.  Microbiology studies are pending.  Head CT scan shows right parietal 
scalp soft tissue edema.  No intracranial bleed or skull fracture.  Stable moderate to marked senesce
nt brain changes.  Stable postoperative left temporal changes as detailed above.



Assessment And Plan:  

1.Sepsis secondary to urinary tract infection.  We will continue with IV fluids, broad-spectrum IV a
ntibiotics.  Follow up on cultures.  Patient has elevated white blood cell count.  Lactate is normali
zed.  Has multiple electrolyte abnormalities.

2.Acute cystitis with hematuria.  We will continue on IV antibiotics.  We will follow up on urine cu
lture.

3.Acute metabolic encephalopathy, patient is now back to baseline.

4.Diastolic congestive heart failure, probably we will continue with monitoring I's and O's.

5.Essential hypertension, uncontrolled.  We will continue with hydralazine p.r.n.

6.Hypokalemia.  We will replace and monitor.

7.Elevated troponin level likely due to sepsis, doubt acute coronary syndrome.

8.Acute rhabdomyolysis, nontraumatic.  CK level is 4198.  We will adjust IV fluids.  Consult Nephrol
ogy.



Plan:  Continue monitoring in ICU setting.





SA/MODL

DD:  11/29/2019 12:15:48Voice ID:  [id number]

DT:  11/29/2019 16:19:04Report ID:  [id number]

## 2019-11-29 NOTE — HP
Date of Admission:  2019



Presenting Complaint:  Fall and confusion.



History Of Present Illness:  Ms. Meghan Givens is a 69-year-old  female with past medical hi
story of hypertension, brain aneurysm with subarachnoid bleed in the past status post clip, resident 
at an assisted living facility, who was brought in after being found on the floor this evening.  Rufina
ent apparently has fallen.  Patient was noted to be confused and was transferred to the ED.  In the E
D, patient was initially confused, but reported mental status slowly improving.  Patient is able to a
nswer a few questions.  She states she was trying to pull a towel from a  in the laundry room a
t the nursing home facility, when she fell.  She states she did not lose consciousness, but she was t
oo weak to get up.  She states she fell about 6 hours prior to presentation in the hospital.  Her nina
ghter, who is in the room with the patient, is happy with patient's history.  Daughter, however, does
 not know patient's current medications or her current medical history.  Patient, however, states she
 is on a diuretic.  She recalls she has chronic low potassium and that she takes her potassium pills.
  She states her blood pressure has been borderline elevated, given a recent followup with her regula
r physician.  She denies any headaches.  She denies any shortness of breath.  She denies any recent d
iarrhea.  She denies any dysuria or urinary frequency or purulent urine smell.  She states she has re
current UTIs about 1 every year.  Daughter states patient has occasional memory problems, but no prio
r episodes of confusion.



Past Medical History:  Significant for hypertension, CVA with history of brain aneurysm and subarachn
oid bleed, history of brain clip, history of DVT during hospitalization for the brain bleed, history 
of IVC filter placement.



Past Surgical History:  Brain aneurysm clip, IVC filter placement, tonsillectomy.



Family History:  Noncontributory in this elderly female.



Allergies:  CODEINE AND SULFA DRUGS.



Home Medications:  Unknown, but apparently, takes medications for blood pressure including a diuretic
 as well as potassium.



Review of Systems:

__________ given patient's reported altered mental status, but patient denies any other symptoms exce
pt as mentioned in the HPI.



Social History:  Patient resides in a senior living community facility.  She is fully functional at [facility], able to manage her own medications.  She denies any tobacco, alcohol, or illicit drug use.



Physical Examination:

Current Vital Signs:  Blood pressure of 163/128, pulse of 90, respiratory rate of 19, temperature afe
brile, on 2 L nasal cannula. 

General:  Elderly female, calm, awake. 

HEENT:  Head is atraumatic, normocephalic.  Pupils are equal, round, and reactive to light.  Pink con
junctivae.  Dry oral mucosa, but __________ secretions at the back of the throat. 

Neck:  No JVD.  No carotid bruit. 

Respiratory:  Good air entry with mild bibasilar crepitations. 

Cardiovascular:  S1, S2.  Rate and rhythm regular.  Loud A2.  No murmur.  No reproducible chest wall 
tenderness. 

Abdomen:  Full, soft, nontender.  Bowel sounds positive.  No suprapubic fullness. 

Extremities:  Trace to 1+ pedal edema bilaterally.  No calf tenderness. 

Neuro:  Patient is awake, conversant.  She is oriented to person and place, but not to time.  Her spe
ech has good flow, but intermittent word-finding difficulty noted.  She is moving extremities symmetr
ically with no neurological deficit.



Laboratory Data:  WBC 22,000, hemoglobin 15.6, platelet 297, neutrophils 89%.  INR of 1.3.  Sodium 14
3, potassium 2.4, chloride 107, bicarb 26, BUN 11, creatinine 1.09, lactic acid of 2.6, calcium 8.8, 
magnesium 1.8.  T-bilirubin of 1.5.  AST and alkaline phosphatase normal.  Troponin 0.1.  ProBNP of 3
747.  Albumin 3.5.  Urinalysis shows greater than 50 wbc's with loaded urine bacteria and 3+ leukocyt
e esterase. 



Chest x-ray shows no acute intrathoracic abnormality.  Head CT shows no acute infarct or bleed.  EKG 
shows sinus rhythm at 90 beats per minute.  Mild LVH pattern, but no significant ST-segment changes. 
 Chest x-ray shows no acute infiltrate.  Prominent aortic notching noted.



Impression:  

1.Urinary tract infection with sepsis.

2.Severe hypokalemia.

3.Metabolic encephalopathy due to urinary tract infection.

4.Presumed diastolic congestive heart failure.

5.Hypertension, uncontrolled.



Plan:  We will admit patient to the intensive care unit given patient's age and comorbidities.  We wi
ll manage patient for the followin.UTI with sepsis.  We will start patient on empiric Levaquin and Zosyn.  We will obtain blood cultu
re x2.  We will follow urine culture and adjust antibiotics.  Follow repeat lactic acid level.

2.Despite elevated proBNP and also stated history of CHF, we will start patient on low-dose hydratio
n with normal saline with potassium for now.

3.Severe hypokalemia may be the cause of weakness and fall before.  We will replete aggressively.  W
e will do both IV potassium replacement as well as p.o.  We will also give 1 dose of magnesium since 
borderline magnesium level.

4.Metabolic encephalopathy.  Follow with UTI treatment.

5.Hypertension.  Patient's medications are not available at this time.  We will do IV hydralazine q.
6 for now and start patient on p.o. medicine when her home medicines are obtained.  Patient might nee
d adjustment of her diuretic with the potassium-sparing one.

6.DVT prophylaxis.  Subcutaneous Lovenox.

7.Advance directives.  Daughter wishes patient to be full code, which patient is agreeable to. 



Total time spent in review of record, discussion with patient, and evaluation was greater than 70 min
utes.





EO/MODL

DD:  2019 03:18:43Voice ID:  [id number]

## 2019-11-30 LAB
ALBUMIN SERPL BCP-MCNC: 2.3 G/DL (ref 3.4–5)
ALP SERPL-CCNC: 63 U/L (ref 45–117)
ALT SERPL W P-5'-P-CCNC: 35 U/L (ref 12–78)
AST SERPL W P-5'-P-CCNC: 80 U/L (ref 15–37)
BUN BLD-MCNC: 12 MG/DL (ref 7–18)
CKMB CREATINE KINASE MB: 3 NG/ML (ref 0.3–3.6)
GLUCOSE SERPLBLD-MCNC: 102 MG/DL (ref 74–106)
HCT VFR BLD CALC: 34 % (ref 36–45)
LYMPHOCYTES # SPEC AUTO: 1.1 K/UL (ref 0.7–4.9)
MAGNESIUM SERPL-MCNC: 2.4 MG/DL (ref 1.8–2.4)
PMV BLD: 9.7 FL (ref 7.6–11.3)
POTASSIUM SERPL-SCNC: 3.9 MMOL/L (ref 3.5–5.1)
RBC # BLD: 3.59 M/UL (ref 3.86–4.86)
UA COMPLETE W REFLEX CULTURE PNL UR: (no result)
UA DIPSTICK W REFLEX MICRO PNL UR: (no result)

## 2019-11-30 RX ADMIN — PIPERACILLIN SODIUM AND TAZOBACTAM SODIUM SCH MLS: 3; .375 INJECTION, POWDER, LYOPHILIZED, FOR SOLUTION INTRAVENOUS at 21:34

## 2019-11-30 RX ADMIN — HEPARIN SODIUM SCH UNIT: 5000 INJECTION, SOLUTION INTRAVENOUS; SUBCUTANEOUS at 17:13

## 2019-11-30 RX ADMIN — Medication SCH ML: at 21:35

## 2019-11-30 RX ADMIN — Medication SCH ML: at 08:20

## 2019-11-30 RX ADMIN — POTASSIUM CHLORIDE AND SODIUM CHLORIDE SCH MLS: 900; 300 INJECTION, SOLUTION INTRAVENOUS at 00:28

## 2019-11-30 RX ADMIN — METOPROLOL TARTRATE SCH MG: 25 TABLET ORAL at 06:03

## 2019-11-30 RX ADMIN — HEPARIN SODIUM SCH UNIT: 5000 INJECTION, SOLUTION INTRAVENOUS; SUBCUTANEOUS at 00:49

## 2019-11-30 RX ADMIN — LEVOFLOXACIN SCH MLS: 5 INJECTION, SOLUTION INTRAVENOUS at 04:13

## 2019-11-30 RX ADMIN — POTASSIUM CHLORIDE AND SODIUM CHLORIDE SCH MLS: 900; 300 INJECTION, SOLUTION INTRAVENOUS at 17:14

## 2019-11-30 RX ADMIN — METOPROLOL TARTRATE SCH MG: 25 TABLET ORAL at 17:13

## 2019-11-30 RX ADMIN — HEPARIN SODIUM SCH UNIT: 5000 INJECTION, SOLUTION INTRAVENOUS; SUBCUTANEOUS at 08:20

## 2019-11-30 RX ADMIN — Medication SCH MG: at 08:20

## 2019-11-30 RX ADMIN — PANTOPRAZOLE SODIUM SCH MG: 40 TABLET, DELAYED RELEASE ORAL at 06:03

## 2019-11-30 RX ADMIN — POTASSIUM CHLORIDE AND SODIUM CHLORIDE SCH: 900; 300 INJECTION, SOLUTION INTRAVENOUS at 19:00

## 2019-11-30 RX ADMIN — PIPERACILLIN SODIUM AND TAZOBACTAM SODIUM SCH MLS: 3; .375 INJECTION, POWDER, LYOPHILIZED, FOR SOLUTION INTRAVENOUS at 08:20

## 2019-11-30 NOTE — PN
Date of Progress Note:  11/30/2019



Patient was admitted with severe rhabdo, electrolyte imbalance, severe hypokalemia.



Physical Examination:

Vital Signs:  When I saw the patient, blood pressure 133/72, pulse of 91, afebrile.  The patient had 
good urine output. 

Chest:  Clear to auscultation. 

Heart:  S1, S2 regular. 

Abdomen:  Soft, nontender. 

Extremities:  No edema.



Laboratory Data:  WBC 16, H and H 11.9/34, platelet 192.  Sodium 146, potassium 3.9, bicarb 23, BUN 1
2, creatinine 1, GFR of 54, calcium 7.3, phos 1.4.  CK down to 2100.



Current Medications:  The patient on include:

1.Levaquin.

2.Zosyn.

3.Hydralazine p.r.n.

4.Metoprolol 25 t.i.d.

5.Zofran.

6.Magnesium sulfate.

7.Normal saline with 40 of K.

8.Thiamine.



Assessment And Plan:  

1.Rhabdomyolysis secondary to fall.  I am going to continue hydration for the patient and we will co
ntinue to monitor.

2.Hypokalemia, resolved.  We will continue to monitor.

3.Hypophosphatemia secondary to rhabdo.  We will supplement.

4.Hypomagnesemia.  We will supplement.

5.Hypertension, been controlled optimal.  Continue current treatment.





KATE

DD:  11/30/2019 12:09:44Voice ID:  [id number]

DT:  11/30/2019 16:19:03Report ID:  [id number]

## 2019-11-30 NOTE — PN
Date of Progress Note:  11/30/2019



Subjective:  Patient seen and examined.  Chart reviewed and case discussed with RN.  Patient seems to
 be back to her baseline mental status.  Feels better today.



Medications:  List reviewed.



Physical Examination:

Vital Signs:  Temperature 98, heart rate 91, blood pressure 133/72, respirations 19, O2 of 95% on miranda
m air. 

General:  Awake, alert, oriented x3.  Elderly female, obese, ill-appearing. 

CV:  S1, S2.  Regular rate and rhythm.  Peripheral pulses present. 

Respiratory:  Moving air well bilaterally.  No wheezing or stridor.  No use of accessory muscles. 

Gastrointestinal:  Abdomen is soft, nontender, nondistended.  Positive bowel sounds. 

Extremities:  No clubbing, cyanosis, or edema.  No calf tenderness. 

Neuro:  Cranial nerves 2 through 12 intact grossly.  No focal neurological deficit.  Speech is altere
d, but comprehensible at baseline.



Laboratory Data:  Sodium 146, potassium 3.9, chloride 117, CO2 of 23, BUN 12, creatinine 1.01, glucos
e 102, calcium 7.3, phosphorus 1.4, magnesium 2.4.  CK level is 2143.  Albumin is 2.3.  WBC 16, H and
 H 11.9 and 34, platelets 192, neutrophils 86%.  Blood cultures, no growth to date.  Urine cultures g
rowing out 4+ gram-negative rods.



Assessment And Plan:  A 69-year-old female with:

1.Sepsis with altered mental status.  Patient is on IV antibiotics.  Cultures growing out gram-negat
lucy rods from the urine.  Blood cultures pending.  Patient's white blood cell count is still elevated
, but trending down.

2.Acute cystitis with hematuria secondary to gram-negative rods.  We will continue IV antibiotics an
d follow up on final identification and sensitivity.

3.Acute metabolic encephalopathy secondary to sepsis, resolved.

4.Diastolic congestive heart failure.  Continue to monitor I's and O's.

5.Hypokalemia, replaced.

6.Hypophosphatemia.  We will replace and monitor.

7.Essential hypertension.  Blood pressure is better controlled.

8.Elevated troponin level, likely due to sepsis.  Doubt acute coronary syndrome.

9.Acute rhabdomyolysis, nontraumatic.  CK level is trending down.  Continue with IV fluids.  Appreci
ate Nephrology input.



Plan:  Will step down from ICU.  Obtain PT evaluation.  Patient may need skilled nursing facility alix
cement.





/JOSH

DD:  11/30/2019 12:41:42Voice ID:  [id number]

DT:  11/30/2019 17:01:51Report ID:  [id number]

## 2019-12-01 LAB
ALBUMIN SERPL BCP-MCNC: 2.3 G/DL (ref 3.4–5)
BUN BLD-MCNC: 11 MG/DL (ref 7–18)
GLUCOSE SERPLBLD-MCNC: 99 MG/DL (ref 74–106)
HCT VFR BLD CALC: 36.4 % (ref 36–45)
LYMPHOCYTES # SPEC AUTO: 1.1 K/UL (ref 0.7–4.9)
MAGNESIUM SERPL-MCNC: 2 MG/DL (ref 1.8–2.4)
PMV BLD: 8.9 FL (ref 7.6–11.3)
POTASSIUM SERPL-SCNC: 3.9 MMOL/L (ref 3.5–5.1)
RBC # BLD: 3.83 M/UL (ref 3.86–4.86)

## 2019-12-01 RX ADMIN — PIPERACILLIN SODIUM AND TAZOBACTAM SODIUM SCH MLS: 3; .375 INJECTION, POWDER, LYOPHILIZED, FOR SOLUTION INTRAVENOUS at 21:00

## 2019-12-01 RX ADMIN — PIPERACILLIN SODIUM AND TAZOBACTAM SODIUM SCH MLS: 3; .375 INJECTION, POWDER, LYOPHILIZED, FOR SOLUTION INTRAVENOUS at 08:56

## 2019-12-01 RX ADMIN — METOPROLOL TARTRATE SCH MG: 25 TABLET ORAL at 05:48

## 2019-12-01 RX ADMIN — Medication SCH MG: at 07:26

## 2019-12-01 RX ADMIN — METOPROLOL TARTRATE SCH MG: 25 TABLET ORAL at 17:46

## 2019-12-01 RX ADMIN — Medication SCH ML: at 21:29

## 2019-12-01 RX ADMIN — HEPARIN SODIUM SCH UNIT: 5000 INJECTION, SOLUTION INTRAVENOUS; SUBCUTANEOUS at 00:27

## 2019-12-01 RX ADMIN — HEPARIN SODIUM SCH UNIT: 5000 INJECTION, SOLUTION INTRAVENOUS; SUBCUTANEOUS at 07:26

## 2019-12-01 RX ADMIN — POTASSIUM CHLORIDE AND SODIUM CHLORIDE SCH MLS: 900; 300 INJECTION, SOLUTION INTRAVENOUS at 06:44

## 2019-12-01 RX ADMIN — Medication SCH ML: at 07:26

## 2019-12-01 RX ADMIN — PANTOPRAZOLE SODIUM SCH MG: 40 TABLET, DELAYED RELEASE ORAL at 05:48

## 2019-12-01 NOTE — PN
Date of Progress Note:  12/01/2019



Subjective:  Patient was admitted with rhabdomyolysis, hypokalemia, hypomagnesemia, UTI.



Physical Examination:

Vital Signs:  Blood pressure 162/82, pulse of 83.  Patient had good urine output. 

Chest:  Clear to auscultation. 

Heart:  S1, S2.  Regular. 

Abdomen:  Soft, nontender. 

Extremities:  No edema.



Laboratory Data:  WBC down to 14.9, H and H 12.7/36.4, platelets 224.  Sodium 144, potassium 3.9, bic
arb 23, BUN 11, creatinine 0.9, calcium 7.7, phos 2.1, magnesium of 2.  Albumin 2.3, corrected calciu
m is 9.



Current Medications:  The patient on include Zosyn, albuterol, hydralazine, metoprolol 25 b.i.d., pan
toprazole, IV fluid.



Assessment And Plan:  

1.Rhabdomyolysis, recovering very well.  We will discontinue IV fluid.  We will monitor the patient.


2.Urinary tract infection secondary to Escherichia coli.  Continue current antibiotic.  The patient 
can be switched to Bactrim p.o. if okay with Primary.  Also, the patient is allergic to Bactrim.  We 
will continue on Zosyn for the time being.  Can be switched to meropenem if needed for outpatient reg
imen in the nursing home.

3.Hypokalemia, resolved.

4.Hypophosphatemia.  I am going to continue supplement.  We will 

start the patient on lisinopril and we will follow up.

5.Hypertension.  Resume lisinopril.





KATE

DD:  12/01/2019 12:20:02Voice ID:  [id number]

DT:  12/01/2019 17:12:34Report ID:  [id number]

## 2019-12-01 NOTE — PN
Date of Progress Note:  12/01/2019



Subjective:  Patient is seen and examined.  Chart was reviewed and case was 
discussed with RN.  Patient states she feels better overall, but is very weak, 
unable to even sit up in bed.



Medications:  List was reviewed.



Physical Examination:

Vital Signs:  Temperature 98.5, heart rate 83, blood pressure 162/82, 
respirations 16, O2 93% on room air. 

General:  Awake, alert, oriented x3.  Elderly female, weak, ill appearing, obese
, BMI 31. 

CV:  S1, S2.  Regular rate and rhythm.  Peripheral pulses present. 

Respiratory:  Moving air well bilaterally.  No wheezing or stridor.  No use of 
accessory muscles. 

Gastrointestinal:  Abdomen is soft, nontender, nondistended.  Positive bowel 
sounds.  No guarding or rigidity. 

Extremities:  No clubbing, cyanosis, or edema. 

Neurologic:  Nonfocal.  However, patient has generalized weakness.



Laboratory Data:  Sodium 144, potassium 3.9, chloride 116, CO2 23, BUN 11, 
creatinine 0.9, glucose 99, calcium 7.7.  Phosphorus 2.1, magnesium 2.  Albumin 
2.3.  WBC 14.9, H and H 12.7 and 36.4, platelets 223, neutrophils 84%.  Blood 
cultures:  No growth to date.  Urine culture:  Growing out E coli, resistant to 
fluoroquinolones, Unasyn, cefazolin, and ampicillin; sensitive to Zosyn.



Assessment:  A 69-year-old female with:

1.   Sepsis with altered mental status, improved, now back to baseline.  Sepsis 
is improving.  White blood cell count is trending down, currently afebrile.  
Culture is growing out E coli from the urine.  Blood cultures have been 
negative to date.

2.   Acute cystitis with hematuria secondary to Escherichia coli.  We will 
continue Zosyn.

3.   Acute metabolic encephalopathy secondary to sepsis, resolved.

4.   Generalized weakness.  We will obtain PT evaluation.  May need skilled 
nursing facility placement.

5.   Chronic diastolic congestive heart failure.  Continue to monitor I's and O'
s, free fluid restriction, chronic.

6.   Hypokalemia, replace.

7.   Hypophosphatemia.  We will replace and monitor.

8.   Acute rhabdomyolysis, nontraumatic.  We will continue to monitor CK level, 
and IV fluids have been adjusted.  Appreciate Nephrology input.

9.   Essential hypertension.  Blood pressure is improved.

10.   Elevated troponin level, likely due to sepsis.  No chest pain.

11.   Deep venous thrombosis prophylaxis:  Addressed.



Plan:  PT evaluation.  Continue with heparin for DVT prophylaxis.  Monitor CK 
level.  Likely will need skilled nursing facility placement.





/JOSH

DD:  12/01/2019 11:01:38   Voice ID:  [id number]

DT:  12/01/2019 15:32:20   Report ID:  [id number]

MTDD

## 2019-12-02 LAB
ALBUMIN SERPL BCP-MCNC: 2.3 G/DL (ref 3.4–5)
BUN BLD-MCNC: 12 MG/DL (ref 7–18)
GLUCOSE SERPLBLD-MCNC: 95 MG/DL (ref 74–106)
HCT VFR BLD CALC: 40.9 % (ref 36–45)
LYMPHOCYTES # SPEC AUTO: 1.3 K/UL (ref 0.7–4.9)
MAGNESIUM SERPL-MCNC: 1.9 MG/DL (ref 1.8–2.4)
PMV BLD: 9.4 FL (ref 7.6–11.3)
POTASSIUM SERPL-SCNC: 4 MMOL/L (ref 3.5–5.1)
RBC # BLD: 4.3 M/UL (ref 3.86–4.86)

## 2019-12-02 RX ADMIN — PANTOPRAZOLE SODIUM SCH MG: 40 TABLET, DELAYED RELEASE ORAL at 06:08

## 2019-12-02 RX ADMIN — Medication SCH ML: at 07:20

## 2019-12-02 RX ADMIN — Medication SCH MG: at 07:20

## 2019-12-02 RX ADMIN — Medication SCH ML: at 22:30

## 2019-12-02 RX ADMIN — PIPERACILLIN SODIUM AND TAZOBACTAM SODIUM SCH MLS: 3; .375 INJECTION, POWDER, LYOPHILIZED, FOR SOLUTION INTRAVENOUS at 22:31

## 2019-12-02 RX ADMIN — METOPROLOL TARTRATE SCH MG: 25 TABLET ORAL at 18:05

## 2019-12-02 RX ADMIN — SODIUM CHLORIDE SCH MLS: 9 INJECTION, SOLUTION INTRAVENOUS at 12:03

## 2019-12-02 RX ADMIN — Medication PRN MG: at 22:29

## 2019-12-02 RX ADMIN — PIPERACILLIN SODIUM AND TAZOBACTAM SODIUM SCH MLS: 3; .375 INJECTION, POWDER, LYOPHILIZED, FOR SOLUTION INTRAVENOUS at 07:19

## 2019-12-02 RX ADMIN — METOPROLOL TARTRATE SCH MG: 25 TABLET ORAL at 06:08

## 2019-12-03 LAB
ALBUMIN SERPL BCP-MCNC: 2.2 G/DL (ref 3.4–5)
BUN BLD-MCNC: 30 MG/DL (ref 7–18)
GLUCOSE SERPLBLD-MCNC: 97 MG/DL (ref 74–106)
MAGNESIUM SERPL-MCNC: 2.2 MG/DL (ref 1.8–2.4)
POTASSIUM SERPL-SCNC: 3.7 MMOL/L (ref 3.5–5.1)

## 2019-12-03 RX ADMIN — SODIUM CHLORIDE SCH MLS: 0.9 INJECTION, SOLUTION INTRAVENOUS at 17:20

## 2019-12-03 RX ADMIN — PIPERACILLIN SODIUM AND TAZOBACTAM SODIUM SCH MLS: 3; .375 INJECTION, POWDER, LYOPHILIZED, FOR SOLUTION INTRAVENOUS at 09:00

## 2019-12-03 RX ADMIN — SODIUM CHLORIDE SCH: 9 INJECTION, SOLUTION INTRAVENOUS at 11:00

## 2019-12-03 RX ADMIN — Medication SCH ML: at 21:22

## 2019-12-03 RX ADMIN — PANTOPRAZOLE SODIUM SCH MG: 40 TABLET, DELAYED RELEASE ORAL at 05:11

## 2019-12-03 RX ADMIN — METOPROLOL TARTRATE SCH MG: 25 TABLET ORAL at 05:11

## 2019-12-03 RX ADMIN — Medication SCH ML: at 09:00

## 2019-12-03 RX ADMIN — Medication PRN MG: at 21:22

## 2019-12-03 RX ADMIN — CEFUROXIME AXETIL SCH MG: 250 TABLET, FILM COATED ORAL at 21:22

## 2019-12-03 RX ADMIN — METOPROLOL TARTRATE SCH MG: 25 TABLET ORAL at 17:19

## 2019-12-03 RX ADMIN — Medication SCH MG: at 09:00

## 2019-12-03 NOTE — PN
Date of Progress Note:  12/03/2019



Subjective:  Patient was admitted with rhabdomyolysis.  Today, kidney function has declined.



Physical Examination:

Vital Signs:  Blood pressure 121/69, pulse of 73. 

Patient had good urine output. 

Chest:  Clear to auscultation. 

Heart:  S1, S2 regular. 

Abdomen:  Soft, nontender. 

Extremities:  Plus edema.



Laboratory Data:  WBC 16.8, H and 14.1/40, platelets of 295.  Sodium 143, potassium 3.7, bicarb 26, B
UN 30, creatinine 1.8, GFR 26, calcium 8.3, magnesium 2.2, phosphorus 3.2, albumin 2.2, corrected deepak
cium is 9.5.  Urine culture growing E coli sensitive to Zosyn.



Current Medications:  Zosyn, vancomycin, hydralazine, metoprolol 50, pantoprazole.



Assessment And Plan:  

1.Acute kidney injury, unknown etiology, possible secondary to ACE inhibitor and vanc.  We will send
 for a vanc trough.  I am going to discontinue Lasix, discontinue lisinopril and we will start the pa
tient on IV fluid and we will monitor the patient closely.  I am going to repeat CK for the patient t
o rule out any rebound on her rhabdo and we will follow up.

2.Hypertension with acute kidney injury.  I am going to hold the lisinopril.  Hold the Lasix for the
 time being.

3.Urinary tract infection secondary to Escherichia coli.  Discontinue vancomycin.  Continue Zosyn, d
ose appropriate.





KATE

DD:  12/03/2019 12:32:49Voice ID:  [id number]

DT:  12/03/2019 16:31:47Report ID:  [id number]

## 2019-12-03 NOTE — PN
Date of Progress Note:  12/02/2019



Chief Complaint:  Electrolyte abnormalities including hypokalemia, 
hypomagnesemia, complicated by rhabdomyolysis and urinary tract infection.



History Of Present Illness:  Patient is a 69-year-old man admitted to the 
hospital because of generalized weakness.  Patient has multiple medical 
problems including history of hypertension; CVA; status post brain aneurysm clip
, complicated with subarachnoid bleeding; DVT, status post IVC filter 
placement.  Patient was admitted to the hospital, was found to have urinary 
tract infection and rhabdomyolysis.  Patient was started on IV fluids.



Review of Systems:

Denies fever, chills.  Denies nausea, vomiting.  Denies PND, orthopnea.



Physical Examination:

Lungs:  Clear to auscultation bilaterally. 

Heart:  S1, S2. 

Abdomen:  Soft, benign. 

Extremities:  No edema.



Laboratory Data:  Sodium 135, potassium 2.7, bicarbonate 25, BUN 11, creatinine 
0.9.  Hemoglobin 14.1, WBC 16.8, platelet count 295,000.  Chemistries show 
sodium 142, potassium 4.0, chloride 111, CO2 25, BUN 12, creatinine 0.88, 
calcium 7.8, phosphorus 2.7.



Impression And Plan:  

1.   Acute on chronic kidney injury.  Creatinine level has improved and BUN 
remains stable, controlled.

2.   Rhabdomyolysis.  CK level is elevated.  Evaluate CK level when blood 
pressure is controlled.

3.   Hypernatremia.  Gradually improving.  Continue adequate hydration and IV 
fluids as needed.

I spent 36 min including 25 min to coordinate care plan.



HEMANTH/JOSH

DD:  12/02/2019 23:34:19   Voice ID:  [id number]

DT:  12/03/2019 03:36:00   Report ID:  [id number]

MARCIAL

## 2019-12-03 NOTE — P.PN
Subjective


Date of Service: 12/03/19


Subjective: No new changes, Doing well


Patient denies any complain today.  She denies any shortness of breath.  Her 

oxygen saturation is 92% on room air.  WBC has decreased to 16.8.  Noted an 

increase in her serum creatinine to 1.88.  Patient states she ambulated with a 

walker and during physical therapy.








Physical Examination





- Vital Signs


Temperature: 98.1 F


Blood Pressure: 113/64


Pulse: 88


Respirations: 26


Pulse Ox (%): 93





- Physical Exam


General: Alert, In no apparent distress, Oriented x3


HEENT: Atraumatic, Normocephalic


Neck: Supple, JVD not distended


Respiratory: Clear to auscultation bilaterally, Normal air movement


Cardiovascular: No edema, Regular rate/rhythm, Normal S1 S2


Gastrointestinal: Normal bowel sounds, Soft and benign, Non-distended, No 

tenderness


Musculoskeletal: No swelling, No erythema


Integumentary: No rashes


Neurological: Normal speech





Assessment And Plan





- Current Problems (Diagnosis)


(1) Acute cystitis without hematuria


Current Visit: Yes   Status: Acute   





(2) Acute metabolic encephalopathy


Current Visit: Yes   Status: Acute   





(3) Acute renal injury


Onset Date: 01/23/17   Current Visit: No   Status: Acute   





(4) Sepsis


Onset Date: 01/23/17   Current Visit: No   Status: Acute   





(5) Chronic diastolic heart failure


Current Visit: Yes   Status: Chronic   





(6) Elevated troponin


Current Visit: Yes   Status: Acute   





(7) Rhabdomyolysis


Current Visit: Yes   Status: Acute   





(8) Essential hypertension


Current Visit: Yes   Status: Chronic   





- Plan


Patient has improved clinically.


She is tolerating room air with good oxygen saturated.


She has been afebrile.


Urine culture is growing E. coli.


Vancomycin discontinued due to concern for vancomycin induced nephropathy given 

increase in serum creatinine.


Will discontinue IV Zosyn and start oral cefuroxime based on antibiotics 

sensitivity.


Monitor renal function.


Nephrology is following. Their input are appreciated.


Serial CK.


Monitor CBC.


PT and OT.

## 2019-12-03 NOTE — ECHO
HEIGHT: 5 ft 4 in   WEIGHT: 183 lb 11.2 oz   DATE OF STUDY: 12/02/2019   REFER DR: Kailey Mercedes MD

2-DIMENSIONAL: YES

     M.MODE: YES

 DOPPLER: YES

COLOR FLOW: YES



                    TDS:  YES

PORTABLE: NO

 DEFINITY:  NO

BUBBLE STUDY: NO





DIAGNOSIS:  SUPRAVENTRICULAR TACHYCARDIA



CARDIAC HISTORY:  

CATHERIZATION: NO

SURGERY: NO

PROSTHETIC VALVE: NO

PACEMAKER: NO





MEASUREMENTS (cm)

    DIASTOLIC (NORMALS)                 SYSTOLIC (NORMALS)

IVSd                 0.9 (0.6-1.2)                    LA Diam       (1.9-4.0)     LVEF     
    78%  

LVIDd               2.5 (3.5-5.7)                        LVIDs      1.4 (2.0-3.5)     %FS  
        45%

LVPWd             1.1 (0.6-1.2)

Ao Diam           3.2 (2.0-3.7)



2 DIMENSIONAL ASSESSMENT:

RIGHT ATRIUM:                   NORMAL

LEFT ATRIUM:       NORMAL



RIGHT VENTRICLE:            NORMAL

LEFT VENTRICLE: NORMAL



TRICUSPID VALVE:             NORMAL

MITRAL VALVE:     NORMAL



PULMONIC VALVE:             NORMAL

AORTIC VALVE:     NORMAL



PERICARDIAL EFFUSION: NONE

AORTIC ROOT:      NORMAL





LEFT VENTRICULAR WALL MOTION:     NORMAL



DOPPLER/COLOR FLOW:     NORMAL



COMMENTS:      NORMAL 2D ECHOCARDIOGRAM WITH DOPPLER.



TECHNOLOGIST:   CHIRAG MARIO

## 2019-12-04 LAB
ALBUMIN SERPL BCP-MCNC: 2 G/DL (ref 3.4–5)
BUN BLD-MCNC: 38 MG/DL (ref 7–18)
GLUCOSE SERPLBLD-MCNC: 89 MG/DL (ref 74–106)
HCT VFR BLD CALC: 34 % (ref 36–45)
LYMPHOCYTES # SPEC AUTO: 1 K/UL (ref 0.7–4.9)
MAGNESIUM SERPL-MCNC: 2.3 MG/DL (ref 1.8–2.4)
PMV BLD: 9.6 FL (ref 7.6–11.3)
POTASSIUM SERPL-SCNC: 3.4 MMOL/L (ref 3.5–5.1)
RBC # BLD: 3.57 M/UL (ref 3.86–4.86)

## 2019-12-04 RX ADMIN — Medication SCH MG: at 09:32

## 2019-12-04 RX ADMIN — SODIUM CHLORIDE SCH: 0.9 INJECTION, SOLUTION INTRAVENOUS at 12:00

## 2019-12-04 RX ADMIN — CEFUROXIME AXETIL SCH MG: 250 TABLET, FILM COATED ORAL at 09:32

## 2019-12-04 RX ADMIN — SODIUM CHLORIDE SCH: 0.9 INJECTION, SOLUTION INTRAVENOUS at 09:00

## 2019-12-04 RX ADMIN — METOPROLOL TARTRATE SCH MG: 25 TABLET ORAL at 18:28

## 2019-12-04 RX ADMIN — CEFUROXIME AXETIL SCH MG: 250 TABLET, FILM COATED ORAL at 21:59

## 2019-12-04 RX ADMIN — Medication SCH: at 09:00

## 2019-12-04 RX ADMIN — Medication SCH: at 21:00

## 2019-12-04 RX ADMIN — PANTOPRAZOLE SODIUM SCH MG: 40 TABLET, DELAYED RELEASE ORAL at 05:58

## 2019-12-04 RX ADMIN — METOPROLOL TARTRATE SCH MG: 25 TABLET ORAL at 05:58

## 2019-12-04 RX ADMIN — SODIUM CHLORIDE SCH MLS: 0.9 INJECTION, SOLUTION INTRAVENOUS at 21:59

## 2019-12-04 NOTE — RAD REPORT
EXAM DESCRIPTION:  RAD - Chest Single View - 12/4/2019 12:36 pm

 

CLINICAL HISTORY:  CXR

Chest pain.

 

COMPARISON:  Chest Single View dated 11/29/2019; Chest Pa And Lat (2 Views) dated 1/27/2017; Chest Si
ngle View dated 1/25/2017; Chest Single View dated 1/23/2017; Head Brain Wo Cont dated 11/29/2019

 

FINDINGS:  Portable technique limits examination quality.

 

Calcified granuloma is present in the left lung base benign. The lungs are otherwise clear. The heart
 is mildly enlarged in size. Shunt tubing traverses the right chest.

 

IMPRESSION:  No acute intrathoracic process suspected.

## 2019-12-04 NOTE — P.PN
Subjective


Date of Service: 12/04/19


Chief Complaint: Admitted for fall and confused


Patient denies any complain today.  She was seen ambulating with a walker 

during physical therapy session. Her oxygen saturation is 92% on room air.  WBC 

continued to trend down.  Noted further increase in her serum creatinine to 

2.5.  Patient states she ambulated with a walker and during physical therapy.








Physical Examination





- Vital Signs


Temperature: 98.2 F


Blood Pressure: 131/84


Pulse: 72


Respirations: 26


Pulse Ox (%): 93





- Physical Exam


General: Alert, In no apparent distress, Oriented x3


HEENT: Mucous membr. moist/pink


Neck: Supple, JVD not distended


Respiratory: Clear to auscultation bilaterally, Normal air movement


Cardiovascular: No edema, Regular rate/rhythm, Normal S1 S2


Capillary refill: <2 Seconds


Gastrointestinal: Normal bowel sounds, Soft and benign, Non-distended, No 

tenderness


Musculoskeletal: No swelling


Integumentary: No rashes


Neurological: Normal speech, Normal strength at 5/5 x4 extr





Assessment And Plan





- Current Problems (Diagnosis)


(1) Acute cystitis without hematuria


Current Visit: Yes   Status: Acute   





(2) Acute metabolic encephalopathy


Current Visit: Yes   Status: Acute   





(3) Acute renal injury


Onset Date: 01/23/17   Current Visit: No   Status: Acute   





(4) Sepsis


Onset Date: 01/23/17   Current Visit: No   Status: Acute   





(5) Chronic diastolic heart failure


Current Visit: Yes   Status: Chronic   





(6) Elevated troponin


Current Visit: Yes   Status: Acute   





(7) Rhabdomyolysis


Current Visit: Yes   Status: Acute   





(8) Essential hypertension


Current Visit: Yes   Status: Chronic   





- Plan


Patient has improved clinically.


Vancomycin and Zosyn discontinued due to concern for antibiotic induced 

nephropathy given increase in serum creatinine.


Continue oral cefuroxime based on antibiotics sensitivity.


Renal ultrasound requested given increase in serum creatinine today.


Continue IV hydration.


Nephrology is following and managing.


CK level has normalized.


Continue PT and OT.

## 2019-12-04 NOTE — RAD REPORT
EXAM DESCRIPTION:  US - Renal Ultrasound-Complete - 12/4/2019 2:03 pm

 

CLINICAL HISTORY:  . Acute renal insufficiency

 

COMPARISON:  2018

 

FINDINGS:  The right kidney measures 11 cm with a normal echotexture.

 

The left kidney measures 11 cm with a normal echotexture.

 

Hydronephrosis is not seen.

 

Small parapelvic cysts are without obvious change

 

The bladder is decompressed and not well imaged

 

IMPRESSION:  Small bilateral parapelvic cysts

## 2019-12-05 LAB
ALBUMIN SERPL BCP-MCNC: 2 G/DL (ref 3.4–5)
BUN BLD-MCNC: 34 MG/DL (ref 7–18)
CREAT UR-SCNC: 73 MG/DL (ref 20–320)
GLUCOSE SERPLBLD-MCNC: 94 MG/DL (ref 74–106)
HCT VFR BLD CALC: 32.6 % (ref 36–45)
LYMPHOCYTES # SPEC AUTO: 0.9 K/UL (ref 0.7–4.9)
PMV BLD: 9.4 FL (ref 7.6–11.3)
POTASSIUM SERPL-SCNC: 3.5 MMOL/L (ref 3.5–5.1)
PROT UR-MCNC: 31 MG/DL (ref ?–11.9)
RBC # BLD: 3.43 M/UL (ref 3.86–4.86)
TSH SERPL DL<=0.05 MIU/L-ACNC: 1.82 UIU/ML (ref 0.36–3.74)
UA COMPLETE W REFLEX CULTURE PNL UR: (no result)
UA DIPSTICK W REFLEX MICRO PNL UR: (no result)

## 2019-12-05 RX ADMIN — METOPROLOL TARTRATE SCH MG: 25 TABLET ORAL at 16:17

## 2019-12-05 RX ADMIN — Medication SCH: at 21:00

## 2019-12-05 RX ADMIN — SODIUM CHLORIDE SCH: 0.9 INJECTION, SOLUTION INTRAVENOUS at 08:00

## 2019-12-05 RX ADMIN — CEFUROXIME AXETIL SCH MG: 250 TABLET, FILM COATED ORAL at 21:27

## 2019-12-05 RX ADMIN — SODIUM CHLORIDE SCH MLS: 0.9 INJECTION, SOLUTION INTRAVENOUS at 06:16

## 2019-12-05 RX ADMIN — Medication PRN MG: at 21:27

## 2019-12-05 RX ADMIN — SODIUM CHLORIDE SCH MLS: 0.9 INJECTION, SOLUTION INTRAVENOUS at 16:17

## 2019-12-05 RX ADMIN — CEFUROXIME AXETIL SCH MG: 250 TABLET, FILM COATED ORAL at 08:55

## 2019-12-05 RX ADMIN — Medication SCH: at 08:58

## 2019-12-05 RX ADMIN — Medication SCH MG: at 08:55

## 2019-12-05 RX ADMIN — METOPROLOL TARTRATE SCH MG: 25 TABLET ORAL at 06:15

## 2019-12-05 RX ADMIN — PANTOPRAZOLE SODIUM SCH MG: 40 TABLET, DELAYED RELEASE ORAL at 06:15

## 2019-12-05 RX ADMIN — HYDRALAZINE HYDROCHLORIDE PRN MG: 20 INJECTION INTRAMUSCULAR; INTRAVENOUS at 17:43

## 2019-12-05 NOTE — PN
Date of Progress Note:  12/04/2019



Subjective:  Patient was admitted with rhabdomyolysis, the  kidney function 
started being deteriorated.



Physical Examination:

Vital Signs:  When I saw the patient, blood pressure 120/84, pulse of 102, 
afebrile. 

Chest:  Clear to auscultation. 

Heart:  S1, S2, regular. 

Abdomen:  Soft.  Nontender. 

Extremities:  +1 edema.



Laboratory Data:  WBC 11.3, H and H 11.8 and 34.0, platelets 239.  Sodium 144, 
potassium 3.4, bicarbonate 24, BUN 28, creatinine 2.5, calcium 8.2, phosphorus 4
, magnesium 2.3, albumin 2.  Corrected calcium is 9.8.



Current Medications:  The patient is on include:

1.   Hydralazine.

2.   Metoprolol 50.

3.   Ceftriaxone.

4.   IV fluid.

5.   Melatonin.

6.   Pantoprazole.





Assessment And Plan:  

1.   Acute kidney injury, unknown etiology.  I am going to go ahead and send 
for workup increase normal saline to 100.  We are going to send for urine Eos 
to rule out any eosinophilia or AIN.  If the kidney function did not improve, 
patient may need renal replacement therapy and may need kidney biopsy.

2.   Urinary tract infection.  Given the worsening kidney function and with the 
possibility that patient may need kidney biopsy, I again repeated the 
urinalysis to see if it has cleared up.  Continue current antibiotic.

3.   Rhabdomyolysis, resolved.

4.   Dementia.  Continue follow up with primary.





KATE

DD:  12/04/2019 22:32:00   Voice ID:  [id number]

DT:  12/05/2019 03:22:22   Report ID:  [id number]

MARCIAL

## 2019-12-05 NOTE — P.PN
Subjective


Date of Service: 12/05/19


Chief Complaint: Admitted for fall and confused


Subjective: No new changes


Patient denies any complain today.  Her serum creatinine has started trending 

down.  Oral intake is improving.  She is tolerating physical therapy.








Physical Examination





- Vital Signs


Temperature: 97 F


Blood Pressure: 174/91


Pulse: 93


Respirations: 18


Pulse Ox (%): 95





- Physical Exam


General: Alert, In no apparent distress


HEENT: Mucous membr. moist/pink


Neck: Supple, JVD not distended


Respiratory: Clear to auscultation bilaterally


Cardiovascular: Regular rate/rhythm, Normal S1 S2


Gastrointestinal: Normal bowel sounds, Soft and benign, Non-distended, No 

tenderness


Integumentary: No rashes


Neurological: Normal speech, Normal strength at 5/5 x4 extr





Assessment And Plan





- Current Problems (Diagnosis)


(1) Acute cystitis without hematuria


Current Visit: Yes   Status: Acute   





(2) Acute metabolic encephalopathy


Current Visit: Yes   Status: Acute   





(3) Acute renal injury


Onset Date: 01/23/17   Current Visit: No   Status: Acute   





(4) Sepsis


Onset Date: 01/23/17   Current Visit: No   Status: Acute   





(5) Chronic diastolic heart failure


Current Visit: Yes   Status: Chronic   





(6) Elevated troponin


Current Visit: Yes   Status: Acute   





(7) Rhabdomyolysis


Current Visit: Yes   Status: Acute   





(8) Essential hypertension


Current Visit: Yes   Status: Chronic   





- Plan


Patient has improved clinically.


Continue oral cefuroxime based on antibiotics sensitivity.  Follow repeat urine 

culture result.


Renal ultrasound shows no acute changes.


Continue IV hydration.


Nephrology is following and managing.


Continue PT and OT.


Disposition to skilled rehab on discharge.

## 2019-12-05 NOTE — PN
Date of Progress Note:  12/05/2019



Subjective:  The patient was admitted with rhabdomyolysis, UTI, developed acute kidney injury.  Patie
nt's workup is still pending yesterday.  We will increase IV fluids.  Kidney function started improvi
ng.



Physical Examination:

Vital Signs:  Blood pressure 154/92, pulse of 75.  Patient had good urine output of 1500. 

Chest:  Clear to auscultation. 

Heart:  S1, S2.  Regular. 

Abdomen:  Soft, nontender. 

Extremities:  Plus edema.



Laboratory Data:  H and H 11.5/32.6.  Sodium 145; potassium 3.5; bicarb 24; BUN 34; creatinine 2.2, t
rending down; GFR of 21; calcium 8.3.  Phosphorus 3.4, albumin of 2.  Serum protein electrophoresis i
s still pending.  TSH 1.8.  Serology still pending.  Urine eosinophil still pending.



Current Medications:  The patient on its include:

1.Cefuroxime.

2.Metoprolol.

3.Zofran.

4.Pantoprazole.

5.IV fluid.

6.Thiamin.



Assessment And Plan:  

1.Acute kidney injury, unknown etiology.  Kidney function started to be improved.  Normal size kidne
y without any hydronephrosis.  I am going to continue current IV fluid.  We will plan tomorrow to dis
continue IV fluids, then watch for another day if kidney function continue to improve.  At that time,
 patient okay from the renal standpoint for discharge planning.

2.Hypokalemia.  I will supplement.

3.Rhabdomyolysis, has been resolved.

4.Urinary tract infection secondary to Escherichia coli.  Continue current antibiotic.  We will foll
ow up with the primary.





KATE

DD:  12/05/2019 11:17:49Voice ID:  [id number]

DT:  12/05/2019 15:53:30Report ID:  [id number]

## 2019-12-06 VITALS — OXYGEN SATURATION: 95 %

## 2019-12-06 LAB
ALBUMIN SERPL BCP-MCNC: 2 G/DL (ref 3.4–5)
BUN BLD-MCNC: 27 MG/DL (ref 7–18)
GLUCOSE SERPLBLD-MCNC: 98 MG/DL (ref 74–106)
HCT VFR BLD CALC: 34.6 % (ref 36–45)
LYMPHOCYTES # SPEC AUTO: 0.9 K/UL (ref 0.7–4.9)
PMV BLD: 9.1 FL (ref 7.6–11.3)
POTASSIUM SERPL-SCNC: 3.5 MMOL/L (ref 3.5–5.1)
RBC # BLD: 3.67 M/UL (ref 3.86–4.86)

## 2019-12-06 RX ADMIN — SODIUM CHLORIDE SCH: 0.9 INJECTION, SOLUTION INTRAVENOUS at 04:00

## 2019-12-06 RX ADMIN — Medication SCH MG: at 08:52

## 2019-12-06 RX ADMIN — Medication PRN MG: at 20:42

## 2019-12-06 RX ADMIN — METOPROLOL TARTRATE SCH MG: 50 TABLET, FILM COATED ORAL at 20:42

## 2019-12-06 RX ADMIN — CEFUROXIME AXETIL SCH MG: 250 TABLET, FILM COATED ORAL at 08:52

## 2019-12-06 RX ADMIN — CEFUROXIME AXETIL SCH MG: 250 TABLET, FILM COATED ORAL at 20:42

## 2019-12-06 RX ADMIN — Medication SCH ML: at 20:43

## 2019-12-06 RX ADMIN — Medication SCH ML: at 08:53

## 2019-12-06 RX ADMIN — METOPROLOL TARTRATE SCH MG: 25 TABLET ORAL at 05:44

## 2019-12-06 RX ADMIN — HYDRALAZINE HYDROCHLORIDE PRN MG: 20 INJECTION INTRAMUSCULAR; INTRAVENOUS at 09:49

## 2019-12-06 RX ADMIN — Medication SCH ML: at 20:41

## 2019-12-06 RX ADMIN — HYDRALAZINE HYDROCHLORIDE PRN MG: 20 INJECTION INTRAMUSCULAR; INTRAVENOUS at 01:50

## 2019-12-06 RX ADMIN — SODIUM CHLORIDE SCH MLS: 0.9 INJECTION, SOLUTION INTRAVENOUS at 05:44

## 2019-12-06 NOTE — P.PN
Subjective


Date of Service: 12/06/19


Chief Complaint: Admitted for fall and confused


Family reports patient is more confused today.  Her serum creatinine continued 

to trend down. She is tolerating physical therapy.








Physical Examination





- Vital Signs


Temperature: 98.9 F


Blood Pressure: 178/92


Pulse: 79


Respirations: 18


Pulse Ox (%): 94





- Physical Exam


General: In no apparent distress, Other (Awake)


HEENT: Mucous membr. moist/pink


Neck: Supple, JVD not distended


Respiratory: Clear to auscultation bilaterally, Normal air movement


Cardiovascular: No edema, Regular rate/rhythm, Normal S1 S2


Gastrointestinal: Normal bowel sounds, Soft and benign, No tenderness


Musculoskeletal: No swelling


Integumentary: No rashes


Neurological: Normal speech, Normal strength at 5/5 x4 extr





Assessment And Plan





- Current Problems (Diagnosis)


(1) Acute cystitis without hematuria


Current Visit: Yes   Status: Acute   





(2) Acute metabolic encephalopathy


Current Visit: Yes   Status: Acute   





(3) Acute renal injury


Onset Date: 01/23/17   Current Visit: No   Status: Acute   





(4) Sepsis


Onset Date: 01/23/17   Current Visit: No   Status: Acute   





(5) Chronic diastolic heart failure


Current Visit: Yes   Status: Chronic   





(6) Elevated troponin


Current Visit: Yes   Status: Acute   





(7) Rhabdomyolysis


Current Visit: Yes   Status: Acute   





(8) Essential hypertension


Current Visit: Yes   Status: Chronic   





- Plan


Repeat urine culture reviewed and reports no significant growth.


Continue oral cefuroxime.  Patient to complete 7 days of treatment.


Renal ultrasound shows no acute changes.


Continue IV fluid


Nephrology is following and managing.


Monitor renal function.


Continue PT and OT.


Resume dementia medications


Antihypertensives


Disposition to skilled rehab on discharge.

## 2019-12-06 NOTE — P.PN
Subjective


Date of Service: 12/06/19


Chief Complaint: Admitted for fall and confused


Subjective: Improving





Pt admitted with Reyna and Rhabdo 








today 


Cr improving 


will dc Lion , TOV 


can be discharged from nephrology point of view 





Physical Examination





- Vital Signs


Temperature: 98.9 F


Blood Pressure: 178/92


Pulse: 79


Respirations: 18


Pulse Ox (%): 94





- Physical Exam


General: Alert, In no apparent distress, Oriented x1


HEENT: Atraumatic


Neck: Supple, Without JVD or thyroid abnormality


Respiratory: Clear to auscultation bilaterally, Normal air movement


Cardiovascular: No edema, Regular rate/rhythm, Normal S1 S2, No gallops, No rubs

, No murmurs


Gastrointestinal: Normal bowel sounds, Soft and benign





Assessment And Plan





- Plan





Acute kidney injury


imprving 


likely due to rhabdomyolysis 


cr improving 


renal dose meds


US: no hydro 





rhabdomyolysis 


resolved 








UTI 


cont Abx 











HTN 


cont current meds

## 2019-12-07 VITALS — TEMPERATURE: 98.8 F

## 2019-12-07 VITALS — DIASTOLIC BLOOD PRESSURE: 80 MMHG | SYSTOLIC BLOOD PRESSURE: 150 MMHG

## 2019-12-07 LAB
ALBUMIN SERPL BCP-MCNC: 1.9 G/DL (ref 3.4–5)
BUN BLD-MCNC: 30 MG/DL (ref 7–18)
GLUCOSE SERPLBLD-MCNC: 93 MG/DL (ref 74–106)
HCV RNA SPEC NAA+PROBE-LOG#: <1.18 LOG IU/ML
POTASSIUM SERPL-SCNC: 3.7 MMOL/L (ref 3.5–5.1)

## 2019-12-07 RX ADMIN — Medication SCH MG: at 09:14

## 2019-12-07 RX ADMIN — HYDRALAZINE HYDROCHLORIDE PRN MG: 20 INJECTION INTRAMUSCULAR; INTRAVENOUS at 12:14

## 2019-12-07 RX ADMIN — CEFUROXIME AXETIL SCH MG: 250 TABLET, FILM COATED ORAL at 09:15

## 2019-12-07 RX ADMIN — Medication SCH ML: at 09:16

## 2019-12-07 RX ADMIN — METOPROLOL TARTRATE SCH MG: 50 TABLET, FILM COATED ORAL at 09:15

## 2019-12-07 RX ADMIN — Medication SCH ML: at 09:15

## 2019-12-07 NOTE — P.DS
Admission Date: 11/29/19


Discharge Date: 12/07/19


Disposition: TRANSFER TO NURSING HOME


Discharge Condition: GOOD


Reason for Admission: Admitted for fall and confused


Consultations: 


Nephrology





Procedures: 


None








- Problems


(1) Acute cystitis without hematuria


Current Visit: Yes   Status: Acute   





(2) Acute metabolic encephalopathy


Current Visit: Yes   Status: Acute   





(3) Acute renal injury


Onset Date: 01/23/17   Current Visit: No   Status: Acute   





(4) Sepsis


Onset Date: 01/23/17   Current Visit: No   Status: Acute   





(5) Chronic diastolic heart failure


Current Visit: Yes   Status: Chronic   





(6) Elevated troponin


Current Visit: Yes   Status: Acute   





(7) Rhabdomyolysis


Current Visit: Yes   Status: Acute   





(8) Essential hypertension


Current Visit: Yes   Status: Chronic   


Brief History of Present Illness: 


6-year-old woman with a history of brain aneurysms status post clips was 

transferred from an assisted living facility to the emergency department due to 

a fall.  The patient reported generalized weakness.  In the ED, patient was 

noted to be confused, she was septic, UA suggesting the presence of UTI.  Blood 

work also demonstrated acute renal failure and rhabdomyolysis.  The patient was 

admitted to the intensive care unit for further management.








Hospital Course: 


Patient was aggressively treated with IV hydration, IV Levaquin and Zosyn and 

later changed to vancomycin and Zosyn.  She responded to treatment, vitals were 

stable and she was subsequently transferred to the medical floor to continue 

treatment.  Her renal function slowly responded to IV hydration.  Renal 

ultrasound was unremarkable.  Nephrology was consulted to assist with 

management.  Her mental status gradually improved as a UTI got treated.  Urine 

culture grew E. coli.  Blood cultures yielded no growth.  Her serum creatinine 

trended down but at that point in time got elevated again and was suspected to 

have vancomycin induced nephropathy.  Antibiotics was scale down to oral 

Cefuroxime, hydration was continued until her serum creatinine trended down 

again.  Currently renal function continued to improve.  She has good urine 

output.  The UTI has adequately being treated.  Her mental status is back to 

baseline.  Patient is considered clinically stable for discharge.  He is 

discharged for 2 more days of Cefuroxime to complete 10 days of treatment.  

Would recommend her renal function panel be checked within the course of next 

week to make sure her serum creatinine continue to trend down.





Vital Signs/Physical Exam: 














Temp Pulse Resp BP Pulse Ox


 


 98.1 F   91 H  18   175/85 H  96 


 


 12/07/19 08:00  12/07/19 09:15  12/07/19 08:00  12/07/19 09:15  12/07/19 08:00








General: Alert, In no apparent distress, Oriented x3


HEENT: Mucous membr. moist/pink


Neck: Supple, JVD not distended


Respiratory: Clear to auscultation bilaterally, Normal air movement


Cardiovascular: No edema, Regular rate/rhythm, Normal S1 S2


Gastrointestinal: Normal bowel sounds, Soft and benign, Non-distended, No 

tenderness


Musculoskeletal: No swelling


Integumentary: No rashes


Neurological: Normal speech


Laboratory Data at Discharge: 














WBC  12.3 K/uL (4.3-10.9)  H  12/06/19  05:54    


 


Hgb  11.9 g/dL (12.0-15.0)  L  12/06/19  05:54    


 


Hct  34.6 % (36.0-45.0)  L  12/06/19  05:54    


 


Plt Count  271 K/uL (152-406)   12/06/19  05:54    


 


PT  16.0 SECONDS (9.5-12.5)  H  11/29/19  01:40    


 


INR  1.37   11/29/19  01:40    


 


Sodium  145 mmol/L (136-145)   12/07/19  05:07    


 


Potassium  3.7 mmol/L (3.5-5.1)   12/07/19  05:07    


 


BUN  30 mg/dL (7-18)  H  12/07/19  05:07    


 


Creatinine  1.83 mg/dL (0.55-1.3)  H  12/07/19  05:07    


 


Glucose  93 mg/dL ()   12/07/19  05:07    


 


Phosphorus  3.5 mg/dL (2.5-4.9)   12/07/19  05:07    


 


Magnesium  2.3 mg/dL (1.8-2.4)   12/04/19  05:36    


 


Total Bilirubin  0.8 mg/dL (0.2-1.0)   11/30/19  05:15    


 


AST  80 U/L (15-37)  H  11/30/19  05:15    


 


ALT  35 U/L (12-78)   11/30/19  05:15    


 


Alkaline Phosphatase  63 U/L ()   11/30/19  05:15    


 


Troponin I  < 0.02 ng/mL (0.0-0.045)   12/02/19  11:07    








Home Medications: 








Alendronate [Fosamax*] 70 mg PO SEECOM 12/01/19 


Amlodipine [Norvasc*] 1 tab PO DAILY 12/01/19 


Metoprolol Tartrate [Lopressor] 50 mg PO BID 12/01/19 


Pantoprazole [Protonix Tab*] 1 tab PO DAILY 12/01/19 


Donepezil HCl [Aricept] 1 tab PO DAILY 12/02/19 


Cefuroxime [Ceftin*] 250 mg PO BID 2 Days #4 tab 12/07/19 


Ensure High Protein 237 ml PO BID  can 12/07/19 


Melatonin 10 mg PO BEDTIME PRN PRN  tablet 12/07/19 


Thiamine HCl [Vitamin B-1*] 100 mg PO DAILY  tablet 12/07/19 





New Medications: 


Cefuroxime [Ceftin*] 250 mg PO BID 2 Days #4 tab


Diet: AHA


Activity: Fall precautions


Followup: 


Az Gonzalez DO [ACTIVE - CAN ADMIT] - 1 Week


Time spent managing pt's care (in minutes): 42

## 2019-12-08 NOTE — PN
Date of Progress Note:  12/07/2019



Chief Complaint:  Acute kidney injury.  Renal function has been improving.  Lion catheter was remove
d.



Review of Systems:

Denies fever, chills.



Physical Examination:

Lungs:  Clear to auscultation bilaterally. 

Heart:  S1, S2. 

Abdomen:  Soft, benign. 

Extremities:  No edema.



Impression And Plan:  

1.Acute kidney injury, improving.  Monitor electrolytes.  Patient developed acute kidney injury seco
ndary to rhabdomyolysis.  Continue renal dose of medication.

2.Rhabdomyolysis, resolved.

3.Urinary tract infection.  Continue treatment according to urine culture.

4.Hypertension.  Continue current medication.





EB/MODL

DD:  12/07/2019 23:38:14Voice ID:  [id number]

DT:  12/08/2019 06:46:11Report ID:  [id number]

## 2019-12-09 LAB
1,25(OH)2D SERPL-MCNC: 23 PG/ML (ref 18–72)
1,25(OH)2D2 SERPL-MCNC: <8 PG/ML
ALBUMIN SERPL ELPH-MCNC: 2.3 G/DL (ref 3.8–4.8)
PROT PATTERN SERPL ELPH-IMP: (no result)

## 2019-12-18 NOTE — PN
Date of Progress Note:  12/02/2019



Subjective:  Patient is seen and examined.  Chart reviewed.  Case discussed with RN.



Medications:  List reviewed.



Physical Examination:

Vital Signs:  Temperature 98, heart rate 75, blood pressure is 145/76, respirations 20, O2 of 92% on 
room air. 

General:  Awake, alert, oriented x3, ill-appearing elderly female. 

CV:  S1, S2.  Regular rate and rhythm. 

Respiratory:  Moving air well bilaterally.  No wheezing. 

Gastrointestinal:  Abdomen is soft, nontender, nondistended.  Positive bowel sounds. 

Extremities:  No clubbing, cyanosis, or edema. 

Neurologic:  Nonfocal.  Overall generalized weakness present.



Laboratory Data:  Sodium 142, potassium 4, chloride 111, CO2 of 25, BUN 12, creatinine 0.88, glucose 
95, calcium 7.8.  Phosphorus 2.7, magnesium 1.9.  CK level 41.  Albumin 2.3.  WBC 16.8, H and H 14.1 
and 40.9, platelets 295, neutrophils 85%.



Assessment And Plan:  A 69-year-old female with:

1.Sepsis along with metabolic encephalopathy, improving.  WBC is trending up.  Cultures positive for
 Escherichia coli from the urine.  No growth from the blood cultures.

2.Acute cystitis with hematuria secondary to Escherichia coli.  Continue antibiotics.

3.Acute metabolic encephalopathy secondary to sepsis, resolved.

4.Generalized weakness.  Continue PT eval.

5.Chronic diastolic congestive heart failure, stable.

6.Hypokalemia, replaced.

7.Hypophosphatemia.  Replace and monitor.

8.Acute rhabdomyolysis.  CK level trending down.  This is nontraumatic.  Nephrology on board.

9.Essential hypertension, stable.

10.Elevated troponin level, likely secondary to sepsis.  No complaints 

of chest pain.

11.Deep vein thrombosis prophylaxis, addressed.





SA/MODL

DD:  12/18/2019 12:31:10Voice ID:  [id number]

DT:  12/18/2019 16:55:31Report ID:  [id number]

## 2020-02-21 ENCOUNTER — HOSPITAL ENCOUNTER (EMERGENCY)
Dept: HOSPITAL 97 - ER | Age: 70
Discharge: ANOTHER HOSPITAL | End: 2020-02-21
Payer: COMMERCIAL

## 2020-02-21 DIAGNOSIS — Z88.6: ICD-10-CM

## 2020-02-21 DIAGNOSIS — I82.4Z3: Primary | ICD-10-CM

## 2020-02-21 DIAGNOSIS — I10: ICD-10-CM

## 2020-02-21 DIAGNOSIS — E87.6: ICD-10-CM

## 2020-02-21 DIAGNOSIS — N39.0: ICD-10-CM

## 2020-02-21 DIAGNOSIS — Z88.2: ICD-10-CM

## 2020-02-21 DIAGNOSIS — E78.00: ICD-10-CM

## 2020-02-21 LAB
ALBUMIN SERPL BCP-MCNC: 2.6 G/DL (ref 3.4–5)
ALP SERPL-CCNC: 99 U/L (ref 45–117)
ALT SERPL W P-5'-P-CCNC: 10 U/L (ref 12–78)
AST SERPL W P-5'-P-CCNC: 17 U/L (ref 15–37)
BUN BLD-MCNC: 24 MG/DL (ref 7–18)
GLUCOSE SERPLBLD-MCNC: 98 MG/DL (ref 74–106)
HCT VFR BLD CALC: 35 % (ref 36–45)
INR BLD: 1.21
LYMPHOCYTES # SPEC AUTO: 1.2 K/UL (ref 0.7–4.9)
MAGNESIUM SERPL-MCNC: 2.2 MG/DL (ref 1.8–2.4)
NT-PROBNP SERPL-MCNC: 622 PG/ML (ref ?–125)
PMV BLD: 8.5 FL (ref 7.6–11.3)
POTASSIUM SERPL-SCNC: 2.9 MMOL/L (ref 3.5–5.1)
RBC # BLD: 3.66 M/UL (ref 3.86–4.86)
TROPONIN (EMERG DEPT USE ONLY): < 0.02 NG/ML (ref 0–0.04)
UA COMPLETE W REFLEX CULTURE PNL UR: (no result)

## 2020-02-21 PROCEDURE — 81003 URINALYSIS AUTO W/O SCOPE: CPT

## 2020-02-21 PROCEDURE — 96365 THER/PROPH/DIAG IV INF INIT: CPT

## 2020-02-21 PROCEDURE — 87086 URINE CULTURE/COLONY COUNT: CPT

## 2020-02-21 PROCEDURE — 87186 SC STD MICRODIL/AGAR DIL: CPT

## 2020-02-21 PROCEDURE — 87040 BLOOD CULTURE FOR BACTERIA: CPT

## 2020-02-21 PROCEDURE — 83880 ASSAY OF NATRIURETIC PEPTIDE: CPT

## 2020-02-21 PROCEDURE — 84484 ASSAY OF TROPONIN QUANT: CPT

## 2020-02-21 PROCEDURE — 87088 URINE BACTERIA CULTURE: CPT

## 2020-02-21 PROCEDURE — 81015 MICROSCOPIC EXAM OF URINE: CPT

## 2020-02-21 PROCEDURE — 83605 ASSAY OF LACTIC ACID: CPT

## 2020-02-21 PROCEDURE — 93970 EXTREMITY STUDY: CPT

## 2020-02-21 PROCEDURE — 87077 CULTURE AEROBIC IDENTIFY: CPT

## 2020-02-21 PROCEDURE — 96372 THER/PROPH/DIAG INJ SC/IM: CPT

## 2020-02-21 PROCEDURE — 99285 EMERGENCY DEPT VISIT HI MDM: CPT

## 2020-02-21 PROCEDURE — 83735 ASSAY OF MAGNESIUM: CPT

## 2020-02-21 PROCEDURE — 85610 PROTHROMBIN TIME: CPT

## 2020-02-21 PROCEDURE — 85025 COMPLETE CBC W/AUTO DIFF WBC: CPT

## 2020-02-21 PROCEDURE — 80076 HEPATIC FUNCTION PANEL: CPT

## 2020-02-21 PROCEDURE — 93005 ELECTROCARDIOGRAM TRACING: CPT

## 2020-02-21 PROCEDURE — 71275 CT ANGIOGRAPHY CHEST: CPT

## 2020-02-21 PROCEDURE — 80048 BASIC METABOLIC PNL TOTAL CA: CPT

## 2020-02-21 PROCEDURE — 71045 X-RAY EXAM CHEST 1 VIEW: CPT

## 2020-02-21 PROCEDURE — 36415 COLL VENOUS BLD VENIPUNCTURE: CPT

## 2020-02-21 PROCEDURE — 96361 HYDRATE IV INFUSION ADD-ON: CPT

## 2020-02-21 NOTE — XMS REPORT
[facility] / [facility]

 Created on:November 3, 2019



Patient:Meghan Givens

Sex:Female

:1950

External Reference #:[id number]





Demographics







 Address  [address] [address]

 

 Phone  [phone]

 

 Preferred Language  en

 

 Marital Status  Unknown

 

 L[employer]-day Affiliation  Unknown

 

 Race  White

 

 Ethnic Group  Unknown









Author







 Organization  [employer]









Care Team Providers







 Name  Role  Phone

 

 Bailey, Na  Provider Role  Unavailable









Allergies

No Known Allergies



Problems







 Problem Type  Condition  Code  Onset Dates  Condition Status

 

 Problem  Osteoporosis  M81.0    Active

 

 Problem  Depression with anxiety  F41.8    Active

 

 Problem  Benign essential HTN  I10    Active

 

 Problem  Memory disturbance  R41.3    Active

 

 Problem  Senile cataract, unspecified  H25.9    Active



   age-related cataract type,      



   unspecified laterality      

 

 Problem  GERD with esophagitis  K21.0    Active

 

 Problem  Obese  E66.9    Active

 

 Problem  Elevated blood pressure reading with  I10    Active



   diagnosis of hypertension      

 

 Problem  Body mass index (BMI) of 40.1 to  Z68.41    Active



   44.9 in adult      

 

 Problem  Hypokalemia  E87.6    Active

 

 Problem  Seasonal allergies  J30.2    Active

 

 Problem  Yeast infection of the skin  B37.2    Active

 

 Problem  Osteoporosis without current  M81.0    Active



   pathological fracture, unspecified      



   osteoporosis type      

 

 Problem  CKD (chronic kidney disease) stage  N18.3    Active



   3, GFR 30-59 ml/min      







Medications

No Known Medications



Results

No Known Results



Summary Purpose

[employer] Submission

## 2020-02-21 NOTE — ER
Nurse's Notes                                                                                     

 [facility]                                                                 

Name: Meghan Givens                                                                               

Age: 70 yrs                                                                                       

Sex: Female                                                                                       

: 1950                                                                                   

MRN: [id number]                                                                                   

Arrival Date: 2020                                                                          

Time: 15:41                                                                                       

Account#: [id number]                                                                            

Bed 14                                                                                            

Private MD:                                                                                       

Diagnosis: Acute embolism and thrombosis of deep veins of lower                                   

  extremity-bilateral;Hypokalemia;Urinary tract infection, site not specified                     

                                                                                                  

Presentation:                                                                                     

                                                                                             

15:42 Presenting complaint: EMS states: Pt from assisted living, c/o L leg swelling, reports  ph  

      that leg has been slightly swollen for about a year, today swelling is much worse, leg      

      also red and painful w/ ambulation and movement. Transition of care: patient was not        

      received from another setting of care. Onset of symptoms was 2020. Risk        

      Assessment: Do you want to hurt yourself or someone else? Patient reports no desire to      

      harm self or others. Initial Sepsis Screen: Does the patient meet any 2 criteria? No.       

      Patient's initial sepsis screen is negative. Does the patient have a suspected source       

      of infection? No. Patient's initial sepsis screen is negative. Care prior to arrival:       

      None.                                                                                       

15:42 Method Of Arrival: EMS: [facility]                                                ph  

15:42 Acuity: MEGHANA 3                                                                           ph  

                                                                                                  

Historical:                                                                                       

- Allergies:                                                                                      

15:57 Codeine;                                                                                ph  

15:57 Sulfa (Sulfonamide Antibiotics);                                                        ph  

- Home Meds:                                                                                      

15:57 donepezil oral oral [Active]; spironolactone Oral [Active]; cholecalciferol (vitamin    ph  

      D3) 400 unit oral tab [Active]; alendronate oral oral [Active]; Triamcinolone Acetonide     

      Topical [Active]; Ketoconazole Topical [Active]; Protonix Oral [Active]; amlodipine         

      oral [Active]; Metoprolol Tartrate Oral [Active]; Potassium Chloride Oral [Active];         

      losartan oral oral [Active]; alendronate oral oral [Active];                                

- PMHx:                                                                                           

15:57 brain anuresm; embolism uknown; High Cholesterol; Hypertension;                         ph  

- PSHx:                                                                                           

15:57 brain surgery; shunt placement; Tonsillectomy; vena cava filter; abd sx;                ph  

                                                                                                  

- Immunization history:: Adult Immunizations unknown.                                             

- Coronavirus screen:: The patient has NOT traveled to [place] in the past 14 days. The             

  patient has NOT had contact with known/suspected case of Coronavirus?.                          

- Social history:: Smoking status: Patient denies any tobacco usage or history of.                

- Ebola Screening: : No symptoms or risks identified at this time.                                

                                                                                                  

                                                                                                  

Screening:                                                                                        

15:58 Abuse screen: Denies threats or abuse. Denies injuries from another. Nutritional        ph  

      screening: No deficits noted. Tuberculosis screening: No symptoms or risk factors           

      identified. Fall Risk None identified.                                                      

                                                                                                  

Assessment:                                                                                       

16:30 General: Appears in no apparent distress. comfortable, Behavior is calm, cooperative,   ph  

      appropriate for age, Denies fever. Pain: Complains of pain in left leg. Neuro: Level of     

      Consciousness is awake, alert, obeys commands, Oriented to person, place, time,             

      situation. Cardiovascular: Reports fatigue, Denies chest pain, shortness of breath,         

      Capillary refill < 3 seconds in bilateral fingers toes Patient's skin is warm and dry.      

      Pulses are palpable in right dorsalis pedis artery and left dorsalis pedis artery Edema     

      is 1+ to right lower thigh, right knee, right midcalf, right ankle and right foot is 3+     

      to left lower thigh, left knee, left midcalf, left ankle and left foot. Respiratory:        

      Airway is patent Respiratory effort is even, unlabored, Respiratory pattern is regular,     

      symmetrical, Denies shortness of breath. GI: No signs and/or symptoms were reported         

      involving the gastrointestinal system. Derm: Skin is intact, is fragile, is thin, Skin      

      is pink, warm \T\ dry. Musculoskeletal: Circulation, motion, and sensation intact. Range    

      of motion: intact in all extremities.                                                       

18:00 Reassessment: Patient appears in no apparent distress at this time. Patient and/or      ph  

      family updated on plan of care and expected duration. Pain level reassessed. Patient is     

      alert, oriented x 3, equal unlabored respirations, skin warm/dry/pink.                      

19:20 General: Appears in no apparent distress. comfortable, Behavior is calm, cooperative,   rr5 

      appropriate for age, awaiting for CT PE result.. Pain: Denies pain. Neuro: Level of         

      Consciousness is awake, alert, obeys commands, Oriented to person, place, time,             

      situation, Appropriate for age. Cardiovascular: Capillary refill < 3 seconds Patient's      

      skin is warm and dry. Pulses are palpable in right dorsalis pedis artery and left           

      dorsalis pedis artery Edema +3 on left leg, +1 on right leg. Respiratory: Airway is         

      patent Respiratory effort is even, unlabored, Respiratory pattern is regular,               

      symmetrical. GI: No signs and/or symptoms were reported involving the gastrointestinal      

      system. : No signs and/or symptoms were reported regarding the genitourinary system.      

      EENT: No signs and/or symptoms were reported regarding the EENT system. Derm: Skin is       

      intact, is fragile, is thin, Skin temperature is warm.                                      

19:22 Musculoskeletal: Capillary refill < 3 seconds, Swelling present in right leg and left   rr5 

      leg.                                                                                        

20:30 Reassessment: Patient appears in no apparent distress at this time. Patient is alert,   rr5 

      oriented x 3, equal unlabored respirations, skin warm/dry/pink. hospitalist came and        

      discussed with ED provider patient will be for transfer to other facility. patient and      

       agreed.                                                                           

21:30 Reassessment: Patient appears in no apparent distress at this time. No changes from     rr5 

      previously documented assessment. Patient is alert, oriented x 3, equal unlabored           

      respirations, skin warm/dry/pink. awaiting for acceptance to other facility. chatting       

      with her , no complaints made.                                                     

22:30 Reassessment: Patient appears in no apparent distress at this time. No changes from     rr5 

      previously documented assessment. diaper wet, patient able to move side to side turn on     

      her own while doing after care. vital signs taken and recorded patient denies any pain.     

23:35 Reassessment: Patient appears in no apparent distress at this time. positive BM, on the rr5 

      bed, after care done, diaper changed.                                                       

23:40 Reassessment: Patient appears in no apparent distress at this time. Patient is alert,   rr5 

      oriented x 3, equal unlabored respirations, skin warm/dry/pink. report given to [facility]       

      EMS awake conscious and coherent not in distress no complaints made, with IV cannula        

      G22 at right forearm started NS 75 ml/Hr infusing well. vital signs taken and recorded.     

                                                                                                  

Vital Signs:                                                                                      

15:45  / 57; Pulse 95; Resp 18; Temp 98.1; Pulse Ox 96% on R/A;                         ph  

17:00 BP 97 / 54; Pulse 87; Resp 18; Pulse Ox 99% on R/A;                                     ph  

18:00 BP 99 / 60; Pulse 86; Resp 18; Pulse Ox 96% on R/A;                                     ph  

19:19 BP 95 / 66; Pulse 88; Resp 19; Temp 98.2; Pulse Ox 98% ; Weight 87.54 kg; Pain 0/10;    rr5 

20:20  / 78; Pulse 85; Resp 20; Pulse Ox 98% on R/A; Pain 0/10;                         rr5 

21:00 BP 92 / 65; Pulse 79; Resp 17; Pulse Ox 97% ;                                           rr5 

22:06  / 68; Pulse 87; Pulse Ox 100% ;                                                  mw2 

23:00 BP 99 / 72; Pulse 89; Resp 18; Pulse Ox 95% ;                                           rr5 

23:40  / 51; Pulse 80; Resp 17; Temp 98; Pulse Ox 99% ;                                 rr5 

                                                                                                  

ED Course:                                                                                        

15:41 Patient arrived in ED.                                                                  ph  

15:42 Silvano Stovall PA is PHCP.                                                                cp  

15:43 Parveen Araujo MD is Attending Physician.                                              cp  

15:45 Triage completed.                                                                       ph  

15:48 EKG done, by EKG tech. reviewed by Parveen Araujo MD.                                    tc  

15:57 Arm band placed on Patient placed in an exam room, on a stretcher, on cardiac monitor,  ph  

      on pulse oximetry.                                                                          

15:58 Patient has correct armband on for positive identification. Bed in low position. Call   ph  

      light in reach. Side rails up X 1. Pulse ox on. NIBP on. Door closed. Noise minimized.      

      Warm blanket given.                                                                         

16:20 XRAY Chest (1 view) In Process Unspecified.                                             EDMS

16:50 US Extremity Venous W Compression Gage In Process Unspecified.                           EDMS

17:32 Initial lab(s) drawn, by me, sent to lab. Inserted saline lock: 22 gauge in right       em1 

      forearm, using aseptic technique. Blood collected.                                          

17:48 Radiology exam delayed due to lab results not completed at this time. (BUN/Creatinine). eh  

17:55 Sarita Holly, RN is Primary Nurse.                                                    ph  

19:22 No provider procedures requiring assistance completed. Patient admitted, IV remains in  ph  

      place.                                                                                      

19:33 Sammy Multani is Hospitalizing Provider.                                               cp  

                                                                                                  

Administered Medications:                                                                         

19:16 Drug: Potassium Effervescent Tablet 50 mEq Route: PO;                                   rr5 

20:20 Follow up: Response: No adverse reaction                                                rr5 

19:36 Drug: Lovenox 1 mg/kg Route: Sub-Q; Site: right lower abdomen;                          rr5 

20:40 Follow up: Response: No adverse reaction                                                rr5 

21:10 Drug: NS 0.9% 1000 ml Route: IV; Rate: 1 bolus; Site: right forearm;                    rr5 

22:30 Follow up: Response: No adverse reaction; IV Status: Completed infusion; IV Intake:     rr5 

      1000ml                                                                                      

22:30 Follow up: Response: No adverse reaction; IV Status: Completed infusion; IV Intake:     rr5 

      1000ml                                                                                      

21:56 Drug: Rocephin - (cefTRIAXone) 1 grams Route: IVPB; Infused Over: 30 mins; Site: right  rr5 

      forearm;                                                                                    

23:00 Follow up: Response: No adverse reaction; IV Status: Completed infusion; IV Intake: [id number] 

23:37 Drug: NS 0.9% 1000 ml Route: IV; Rate: 75 ml/hr; Site: right forearm;                   rr5 

23:45 Follow up: Response: No adverse reaction; IV Status: Infusion continued upon transfer   rr5 

                                                                                                  

                                                                                                  

Intake:                                                                                           

22:30 IV: 1000ml; Total: 1000ml.                                                              rr5 

22:30 IV: 1000ml; Total: 2000ml.                                                              rr5 

23:00 IV: 50ml; Total: 2050ml.                                                                rr5 

23:00 urine wet                                                                               rr5 

                                                                                                  

Output:                                                                                           

23:00 Other: 1 (Diapers) ; Total: 0ml.                                                        rr5 

23:41 Stool: 1 (Loose Stool) ; Total: 0ml.                                                    rr5 

23:00 urine wet                                                                               rr5 

                                                                                                  

Outcome:                                                                                          

19:34 Decision to Hospitalize by Provider.                                                    cp  

20:16 ER care complete, transfer ordered by MD.                                               cp  

23:49 Transferred by ground EMS to [facility], Transfer form completed.    rr5 

23:49 Condition: stable                                                                       rr5 

23:49 Instructed on the need for transfer.                                                        

23:50 Patient left the ED.                                                                    rr5 

                                                                                                  

Addendum:                                                                                         

2020                                                                                        

     07:27 Addendum: Culture Results: Positive urine culture. Phone call Attempt #1 Faxed urine    s
s

           culture report to [facility] ATTN er. FAX # [phone].                  

                                                                                                  

Signatures:                                                                                       

Dispatcher [employer]                           EDMS                                                 

Keven Morfin Eric                               em1                                                  

Edilma Stanley RN RN   ss                                                   

Iris Hester EKG Tech               EKG [facility]                                                   

Sarita Holly RN RN   ph                                                   

PageSilvano PA PA cp [place], Cassie                            mw2                                                  

Jeferson Matthews, RN                      RN   rr5                                                  

                                                                                                  

**************************************************************************************************

## 2020-02-21 NOTE — EDPHYS
Physician Documentation                                                                           

 [facility]                                                                 

Name: Meghan Givens                                                                               

Age: 70 yrs                                                                                       

Sex: Female                                                                                       

: 1950                                                                                   

MRN: [id number]                                                                                   

Arrival Date: 2020                                                                          

Time: 15:41                                                                                       

Account#: [id number]                                                                            

Bed 14                                                                                            

Private MD:                                                                                       

ED Physician Parveen Araujo                                                                       

HPI:                                                                                              

                                                                                             

15:51 This 70 yrs old  Female presents to ER via EMS with complaints of Leg Swelling.cp  

15:51 The patient presents with swelling, tenderness. The complaints affect the left lower    cp  

      extremity. Context: resulted from an unknown cause, uses a walker. Onset: The               

      symptoms/episode began/occurred at an unknown time. Associated signs and symptoms:          

      Pertinent negatives fever, numbness, rash, warmth. Treatment prior to arrival includes:     

      no previous treatment. Severity of symptoms: in the emergency department the symptoms       

      are unchanged, despite home interventions.                                                  

                                                                                                  

Historical:                                                                                       

- Allergies:                                                                                      

15:57 Codeine;                                                                                ph  

15:57 Sulfa (Sulfonamide Antibiotics);                                                        ph  

- Home Meds:                                                                                      

15:57 donepezil oral oral [Active]; spironolactone Oral [Active]; cholecalciferol (vitamin    ph  

      D3) 400 unit oral tab [Active]; alendronate oral oral [Active]; Triamcinolone Acetonide     

      Topical [Active]; Ketoconazole Topical [Active]; Protonix Oral [Active]; amlodipine         

      oral [Active]; Metoprolol Tartrate Oral [Active]; Potassium Chloride Oral [Active];         

      losartan oral oral [Active]; alendronate oral oral [Active];                                

- PMHx:                                                                                           

15:57 brain anuresm; embolism uknown; High Cholesterol; Hypertension;                         ph  

- PSHx:                                                                                           

15:57 brain surgery; shunt placement; Tonsillectomy; vena cava filter; abd sx;                ph  

                                                                                                  

- Immunization history:: Adult Immunizations unknown.                                             

- Coronavirus screen:: The patient has NOT traveled to [place] in the past 14 days. The             

  patient has NOT had contact with known/suspected case of Coronavirus?.                          

- Social history:: Smoking status: Patient denies any tobacco usage or history of.                

- Ebola Screening: : No symptoms or risks identified at this time.                                

                                                                                                  

                                                                                                  

ROS:                                                                                              

15:55 Constitutional: Negative for body aches, chills, fever, poor PO intake.                 cp  

15:55 Eyes: Negative for injury, pain, redness, and discharge.                                cp  

15:55 ENT: Negative for drainage from ear(s), ear pain, sore throat, difficulty swallowing,       

      difficulty handling secretions.                                                             

15:55 Cardiovascular: Negative for chest pain, palpitations.                                      

15:55 Respiratory: Negative for cough, shortness of breath, wheezing.                             

15:55 Abdomen/GI: Negative for abdominal pain, nausea, vomiting, and diarrhea.                    

15:55 MS/extremity: Positive for swelling, tenderness, of the left leg, Negative for injury       

      or acute deformity, decreased range of motion.                                              

15:55 Skin: Negative for cellulitis, rash.                                                        

15:55 Neuro: Negative for altered mental status, headache, weakness.                              

15:55 All other systems are negative.                                                             

                                                                                                  

Exam:                                                                                             

15:53 ECG was reviewed by the Attending Physician.                                            cp  

16:00 Constitutional: The patient appears in no acute distress, alert, awake,                 cp  

      non-diaphoretic, non-toxic, well developed, well nourished, uncomfortable.                  

16:00 Head/Face:  Normocephalic, atraumatic.                                                  cp  

16:00 Eyes: Periorbital structures: appear normal, Pupils: equal, round, and reactive to          

      light and accomodation, Extraocular movements: intact throughout, Conjunctiva: normal,      

      no exudate, no injection, Sclera: no appreciated abnormality, Lids and lashes: appear       

      normal, bilaterally.                                                                        

16:00 ENT: External ear(s): are unremarkable, Nose: is normal, Mouth: Lips: moist, Oral           

      mucosa: pink and intact, moist, Posterior pharynx: is normal, airway is patent, no          

      erythema, no exudate, Voice: is normal.                                                     

16:00 Chest/axilla: Inspection: normal, Palpation: is normal, no crepitus, no tenderness.         

16:00 Cardiovascular: Rate: normal, Rhythm: regular, Edema: marked swelling of left leg when      

      compared to right, JVD: is not appreciated.                                                 

16:00 Respiratory: the patient does not display signs of respiratory distress,  Respirations:     

      normal, no use of accessory muscles, no retractions, no splinting, no tachypnea,            

      labored breathing, is not present, Breath sounds: are clear throughout, no decreased        

      breath sounds, no stridor, no wheezing.                                                     

16:00 Abdomen/GI: Inspection: abdomen appears normal, Bowel sounds: active, all quadrants,        

      Palpation: abdomen is soft and non-tender, in all quadrants, voluntary guarding, is not     

      appreciated, involuntary guarding, is not appreciated.                                      

16:00 Back: pain, is absent, ROM is normal.                                                       

16:00 Skin: cellulitis, is not appreciated, no rash present.                                      

16:00 Neuro: Orientation: to person, place \T\ time. Mentation: is normal, Motor: moves all       

      fours, strength is normal, Sensation: is normal.                                            

                                                                                                  

Vital Signs:                                                                                      

15:45  / 57; Pulse 95; Resp 18; Temp 98.1; Pulse Ox 96% on R/A;                         ph  

17:00 BP 97 / 54; Pulse 87; Resp 18; Pulse Ox 99% on R/A;                                     ph  

18:00 BP 99 / 60; Pulse 86; Resp 18; Pulse Ox 96% on R/A;                                     ph  

19:19 BP 95 / 66; Pulse 88; Resp 19; Temp 98.2; Pulse Ox 98% ; Weight 87.54 kg; Pain 0/10;    rr5 

20:20  / 78; Pulse 85; Resp 20; Pulse Ox 98% on R/A; Pain 0/10;                         rr5 

21:00 BP 92 / 65; Pulse 79; Resp 17; Pulse Ox 97% ;                                           rr5 

22:06  / 68; Pulse 87; Pulse Ox 100% ;                                                  mw2 

23:00 BP 99 / 72; Pulse 89; Resp 18; Pulse Ox 95% ;                                           rr5 

23:40  / 51; Pulse 80; Resp 17; Temp 98; Pulse Ox 99% ;                                 rr5 

                                                                                                  

MDM:                                                                                              

15:49 Patient medically screened.                                                               

19:35 Data reviewed: vital signs, nurses notes, lab test result(s), EKG, radiologic studies,  cp  

      CT scan, ultrasound.                                                                        

19:35 Physician consultation: Sammy Multani was called at 19:35, was contacted at 19:35,      cp  

      regarding admission, to the telemetry unit. patient's condition.                            

20:00 Physician consultation: Sammy Multani after a discussion of the case, a recommendation  cp  

      for transfer for higher level of care is made, in the emergency department to see           

      patient at 19:45, consulted with vascular, DR Whitfield, \T\[facility]        

      who recommends CT Venogram and consult with interventional radiology for possible           

      thrombolysis.                                                                               

                                                                                                  

                                                                                             

15:50 Order name: Basic Metabolic Panel; Complete Time: 18:11                                 cp  

                                                                                             

18:11 Interpretation: Normal except: K 2.9; ; BUN 24; GFR 44; CA 8.2.                   cp  

                                                                                             

15:50 Order name: CBC with Diff; Complete Time: 18:07                                         cp  

                                                                                             

18:08 Interpretation: Normal except: WBC 11.6; RBC 3.66; HGB 11.5; HCT 35.0; RDW 15.3; SALOME%   cp  

      76.9; LYM% 10.0; NEUT A 9.0.                                                                

                                                                                             

15:50 Order name: LFT's; Complete Time: 18:11                                                 cp  

                                                                                             

18:12 Interpretation: Normal except: ALT 10; BILID 0.3; ALB 2.6; GLOB 4.4; A/G 0.6.           cp  

                                                                                             

15:50 Order name: Magnesium; Complete Time: 18:11                                             cp  

                                                                                             

18:13 Interpretation: Within normal limits: MG 2.2.                                           cp  

                                                                                             

15:50 Order name: NT PRO-BNP; Complete Time: 18:11                                            cp  

                                                                                             

18:12 Interpretation: Abnormal: NT PRO-.                                               cp  

                                                                                             

15:50 Order name: PT-INR; Complete Time: 18:07                                                cp  

                                                                                             

18:12 Interpretation: Abnormal: PT 14.2.                                                      cp  

                                                                                             

15:50 Order name: Troponin (emerg Dept Use Only); Complete Time: 18:11                        cp  

                                                                                             

18:12 Interpretation: TROPED < 0.02; Reviewed.                                                cp  

                                                                                             

20:50 Order name: Lactate                                                                     cp  

                                                                                             

21:00 Order name: Urine Microscopic Only                                                      cp  

                                                                                             

21:11 Order name: Urine Dipstick--Ancillary (enter results)                                   mw2 

                                                                                             

21:11 Order name: Blood Culture Adult (2)                                                     cp  

                                                                                             

21:11 Order name: Urine Culture                                                               cp  

                                                                                             

21:21 Order name: Urine Dipstick-Ancillary; Complete Time: 21:43                              EDMS

                                                                                             

21:44 Interpretation: Normal except: UBLD 2+; UPROT 1+; U NIT POSITIVE; UESTR 3+.             cp  

                                                                                             

21:33 Order name: Urine Microscopic Only; Complete Time: 21:43                                EDMS

                                                                                             

21:44 Interpretation: Normal except: UWBC 20-50; UBACT >50; SQEPI 5-10.                       cp  

                                                                                             

15:50 Order name: XRAY Chest (1 view); Complete Time: 18:07                                     

                                                                                             

15:50 Order name: EKG; Complete Time: 15:51                                                   cp  

                                                                                             

15:50 Order name: Cardiac monitoring; Complete Time: 18:49                                    cp  

                                                                                             

15:50 Order name: EKG - Nurse/Tech; Complete Time: 15:59                                      cp  

                                                                                             

15:50 Order name: IV Saline Lock; Complete Time: 17:32                                        cp  

                                                                                             

15:50 Order name: Labs collected and sent; Complete Time: 17:32                               cp  

                                                                                             

15:50 Order name: O2 Per Protocol; Complete Time: 18:49                                       cp  

                                                                                             

15:50 Order name: US Extremity Venous W Compression Gage; Complete Time: 18:07                   

                                                                                             

18:08 Interpretation: Report reviewed.                                                          

                                                                                             

16:59 Order name: CT Chest For PE Angio                                                         

                                                                                             

19:38 Order name: CT; Complete Time: 20:47                                                    EDMS

                                                                                             

21:37 Order name: Lactate; Complete Time: 21:43                                               EDMS

                                                                                             

22:18 Interpretation: Within normal limits: LAC 1.3.                                            

                                                                                             

15:50 Order name: O2 Sat Monitoring; Complete Time: 18:49                                       

                                                                                             

21:00 Order name: Urine Dipstick-Ancillary (obtain specimen); Complete Time: 23:12            cp  

                                                                                                  

ECG:                                                                                              

15:53 Rate is 86 beats/min. Rhythm is regular. QRS interval is normal. QT interval is normal. cp  

      Interpreted by me. Reviewed by me.                                                          

                                                                                                  

Administered Medications:                                                                         

19:16 Drug: Potassium Effervescent Tablet 50 mEq Route: PO;                                   rr5 

20:20 Follow up: Response: No adverse reaction                                                rr5 

19:36 Drug: Lovenox 1 mg/kg Route: Sub-Q; Site: right lower abdomen;                          rr5 

20:40 Follow up: Response: No adverse reaction                                                rr5 

21:10 Drug: NS 0.9% 1000 ml Route: IV; Rate: 1 bolus; Site: right forearm;                    rr5 

22:30 Follow up: Response: No adverse reaction; IV Status: Completed infusion; IV Intake:     rr5 

      1000ml                                                                                      

22:30 Follow up: Response: No adverse reaction; IV Status: Completed infusion; IV Intake:     rr5 

      1000ml                                                                                      

21:56 Drug: Rocephin - (cefTRIAXone) 1 grams Route: IVPB; Infused Over: 30 mins; Site: right  rr5 

      forearm;                                                                                    

23:00 Follow up: Response: No adverse reaction; IV Status: Completed infusion; IV Intake: 03mohe0 

23:37 Drug: NS 0.9% 1000 ml Route: IV; Rate: 75 ml/hr; Site: right forearm;                   rr5 

23:45 Follow up: Response: No adverse reaction; IV Status: Infusion continued upon transfer   rr5 

                                                                                                  

                                                                                                  

Disposition:                                                                                      

20 20:16 Transfer ordered to [facility]. Diagnosis are Acute     

  embolism and thrombosis of deep veins of lower extremity - bilateral,                           

  Hypokalemia, Urinary tract infection, site not specified.                                       

- Reason for transfer: Higher level of care.                                                      

- Accepting physician is DR Harp.                                                             

- Condition is Stable.                                                                            

- Problem is new.                                                                                 

- Symptoms are unchanged.                                                                         

                                                                                                  

                                                                                                  

                                                                                                  

Addendum:                                                                                         

2020                                                                                        

     08:21 Co-signature as Attending Physician, Parveen Araujo MD I agree with the assessment and   k
dr

           plan of care.                                                                          

                                                                                                  

Signatures:                                                                                       

Dispatcher [employer]                           EDMS                                                 

Parveen Araujo MD MD   [facility]                                                  

Sarita Holly, RN                      RN   ph                                                   

Silvano Stovall PA                         PA   cp                                                   

Jeferson Matthews, RN                      RN   rr5                                                  

                                                                                                  

Corrections: (The following items were deleted from the chart)                                    

                                                                                             

18:11 18:11 Normal except: K 2.9; ; BUN 24; GFR 44. cp                                  cp  

19:48 19:34 Hospitalization Ordered by Sammy Multani for Inpatient Admission. Preliminary     cp  

      diagnosis is Acute embolism and thrombosis of deep veins of lower extremity. Bed            

      requested for Telemetry/MedSurg (Inpatient). Status is Inpatient Admission. Condition       

      is Stable. Problem is new. Symptoms are unchanged. cp                                       

20:14 19:48 2020 19:34 Hospitalization Ordered by Sammy Multani for Inpatient           cp  

      Admission. Preliminary diagnosis is Acute embolism and thrombosis of deep veins of          

      lower extremity; Hypokalemia. Bed requested for Telemetry/MedSurg (Inpatient). Status       

      is Inpatient Admission. Condition is Stable. Problem is new. Symptoms are unchanged. cp     

20:55 20:16 2020 20:16 Transfer ordered to [facility].       cp  

      Diagnosis is Acute embolism and thrombosis of deep veins of lower extremity -               

      bilateral; Hypokalemia. Reason for transfer: Higher level of care. Accepting physician      

      is Doctor. Condition is Stable. Problem is new. Symptoms are unchanged. cp                  

21:10 20:55 2020 20:16 Transfer ordered to [facility].       cp  

      Diagnosis is Acute embolism and thrombosis of deep veins of lower extremity -               

      bilateral; Hypokalemia. Reason for transfer: Higher level of care. Accepting physician      

      is DR Harp. Condition is Stable. Problem is new. Symptoms are unchanged. cp             

23:50 21:10 2020 20:16 Transfer ordered to [facility].       rr5 

      Diagnosis is Acute embolism and thrombosis of deep veins of lower extremity -               

      bilateral; Hypokalemia; Urinary tract infection, site not specified. Reason for             

      transfer: Higher level of care. Accepting physician is DR Harp. Condition is            

      Stable. Problem is new. Symptoms are unchanged. cp                                          

                                                                                                  

**************************************************************************************************

## 2020-02-21 NOTE — XMS REPORT
[facility]

 Created on:2019



Patient:Meghan Givens

Sex:Female

:1950

External Reference #:[id number]





Demographics







 Address  [address] [place] [id number]

 

 Phone  [phone]

 

 Preferred Language  en

 

 Marital Status  Unknown

 

 Evangelical Affiliation  Unknown

 

 Race  White

 

 Ethnic Group  Unknown









Author







 Organization  [employer]









Care Team Providers







 Name  Role  Phone

 

 Bailey, Na  Provider Role  Unavailable









Allergies, Adverse Reactions, Alerts







 Substance  Reaction  Event Type

 

 Bactrim DS  Info Not Available  Drug Allergy







Problems







 Problem Type  Condition  Code  Onset Dates  Condition Status

 

 Assessment  Influenza vaccination declined  Z28.21    Active

 

 Assessment  Vitamin D deficiency  E55.9    Active

 

 Assessment  Colon cancer screening declined  Z53.20    Active

 

 Assessment  Screening for osteoporosis  Z13.820    Active

 

 Assessment  Screening for breast cancer  Z12.39    Active

 

 Assessment  Body mass index (BMI) of 40.1 to  Z68.41    Active



   44.9 in adult      

 

 Problem  Depression with anxiety  F41.8    Active

 

 Assessment  Osteoporosis without current  M81.0    Active



   pathological fracture, unspecified      



   osteoporosis type      

 

 Problem  Benign essential HTN  I10    Active

 

 Assessment  CKD (chronic kidney disease) stage  N18.3    Active



   3, GFR 30-59 ml/min      

 

 Problem  Yeast infection of the skin  B37.2    Active

 

 Problem  CKD (chronic kidney disease) stage  N18.3    Active



   3, GFR 30-59 ml/min      

 

 Problem  Seasonal allergies  J30.2    Active

 

 Problem  Screening for breast cancer  Z12.39    Active

 

 Problem  Bilateral lower extremity edema  R60.0    Active

 

 Assessment  Hypokalemia  E87.6    Active

 

 Assessment  Bilateral lower extremity edema  R60.0    Active

 

 Problem  Vitamin D deficiency  E55.9    Active

 

 Assessment  GERD with esophagitis  K21.0    Active

 

 Problem  Body mass index (BMI) of 40.1 to  Z68.41    Active



   44.9 in adult      

 

 Problem  Osteoporosis without current  M81.0    Active



   pathological fracture, unspecified      



   osteoporosis type      

 

 Problem  Elevated blood pressure reading  I10    Active



   with diagnosis of hypertension      

 

 Problem  Hypokalemia  E87.6    Active

 

 Problem  Memory disturbance  R41.3    Active

 

 Assessment  Elevated blood pressure reading  I10    Active



   with diagnosis of hypertension      

 

 Assessment  Encounter for general adult medical  Z00.01    Active



   examination with abnormal findings      

 

 Problem  Osteoporosis  M81.0    Active

 

 Problem  Obese  E66.9    Active

 

 Problem  Senile cataract, unspecified  H25.9    Active



   age-related cataract type,      



   unspecified laterality      

 

 Problem  GERD with esophagitis  K21.0    Active







Medications







 Medication  Code  Code  Instructions  Start  End  Status  Dosage



   System      Date  Date    

 

 Alendronate  NDC  67522582492  70 MG Orally    March  Active  TAKE ONE



 Sodium          28,    WEEKLY



               

 

 Cholecalciferol  NDC  0  5000 UNIT      Active  1 capsule



       Orally Once a        



       day        

 

 Losartan  NDC  93347787239  50 MG Orally      Active  1 tablet



 Potassium      Once a day        

 

 Nystatin  NDC  39723224673  257512 UNIT/ML      Active  1 application



       Mouth/Throat        to affected



       Four times a        area



       day        

 

 Spironolactone  NDC  59189727508  50 MG Orally      Active  1 tablet with



       Once a day        food

 

 Metoprolol  NDC  26524369594  50 MG Orally      Active  1 tablet with



 Tartrate      Twice a day        food

 

 Triamcinolone  NDC  63727857030  0.1 %      Active  1 application



 Acetonide      Externally        to affected



       Twice a day        area

 

 Protonix  NDC  77617161847  40 MG Orally      Active  1 tablet



       Once a day        

 

 Potassium  NDC  09553899375  20 MEQ Orally    May 17,  Active  2 packets



 Chloride      Once a day        with food

 

 Amlodipine  NDC  33943865267  5 MG Orally      Active  1 tablet



 Besylate      Once a day        

 

 Donepezil HCl  NDC  22296033275  10 MG Orally      Active  1 tablet at



       Once a day        bedtime

 

 Alendronate  NDC  77340474995  70 MG Orally    May 17,  Active  1 tablet



 Sodium      once a week        

 

 Ketoconazole  NDC  76570122481  2 % Externally      Active  1 application



       twice a day        to affected



               area







Results

No Known Results



Summary Purpose

[employer] Submission

## 2020-02-21 NOTE — RAD REPORT
EXAM DESCRIPTION:  CT - Chest For Pe Angio - 2/21/2020 6:26 pm

 

CLINICAL HISTORY:   Shortness of breath/leg DVT

 

COMPARISON:  2013

 

TECHNIQUE:  Dynamically enhanced axial 3 mm thick images of the chest were obtained during administra
tion of <100> mL Isovue 370 IV contrast. Coronal and oblique reconstruction images were generated and
 reviewed. Exam utilizes a protocol for optimal evaluation of pulmonary arterial tree.

 

Maximum intensity projections 3D imaging was utilized

 

All CT scans are performed using dose optimization technique as appropriate and may include automated
 exposure control or mA/KV adjustment according to patient size.

 

FINDINGS:  A pulmonary embolus is not seen.

 

A thoracic aortic aneurysm is not noted. Ascending thoracic aorta has an AP diameter 3.7 centimeters

 

A pleural effusion is not seen. A pericardial effusion is not seen.

 

A lung consolidation is not present. Calcified granuloma left lung

 

IMPRESSION:  Negative for a pulmonary embolism.

## 2020-02-21 NOTE — RAD REPORT
EXAM DESCRIPTION:  USExtrem Venous W Compress Bil2/21/2020 4:49 pm

 

CLINICAL HISTORY:  Bilateral leg swelling

 

COMPARISON:  2017

 

FINDINGS:  Echogenic material consistent with thrombus is present within the right popliteal vein. Th
e vein is not compressible

 

Echogenic material consistent with thrombus is present within the left common femoral, left superfici
al femoral and left popliteal veins. Little flow is seen. Veins are not compressible

 

4 x 1 centimeter Baker's cyst left leg

 

IMPRESSION:  Bilateral acute deep veinous thrombosis involving the legs

 

4.1 centimeter left Baker's cyst

## 2020-02-21 NOTE — RAD REPORT
EXAM DESCRIPTION:  Jhonathan Single View2/21/2020 4:19 pm

 

CLINICAL HISTORY:  Hypertension/swelling

 

COMPARISON:  2019

 

FINDINGS:   The lungs appear clear of acute infiltrate. The heart is borderline enlarged.

 

A  shunt courses the right chest

 

IMPRESSION:   No acute abnormalities displayed

## 2020-02-22 VITALS — OXYGEN SATURATION: 99 % | DIASTOLIC BLOOD PRESSURE: 51 MMHG | SYSTOLIC BLOOD PRESSURE: 111 MMHG | TEMPERATURE: 98 F

## 2020-02-22 NOTE — EKG
Test Date:    2020-02-21               Test Time:    15:44:29

Technician:   TC                                     

                                                     

MEASUREMENT RESULTS:                                       

Intervals:                                           

Rate:         86                                     

IL:                                                  

QRSD:         96                                     

QT:           392                                    

QTc:          469                                    

Axis:                                                

P:                                                   

IL:                                                  

QRS:          8                                      

T:            19                                     

                                                     

INTERPRETIVE STATEMENTS:                                       

                                                     

Accelerated Junctional rhythm

Cannot rule out Anterior infarct, age undetermined

Abnormal ECG

Compared to ECG 11/29/2019 00:23:38

Accelerated junctional rhythm now present

Myocardial infarct finding now present

Sinus tachycardia no longer present

Atrial premature complex(es) no longer present

Left-axis deviation no longer present

T-wave abnormality no longer present



Electronically Signed On 02-22-20 16:27:36 CST by Sanju Cheung